# Patient Record
Sex: FEMALE | Race: WHITE | Employment: UNEMPLOYED | URBAN - NONMETROPOLITAN AREA
[De-identification: names, ages, dates, MRNs, and addresses within clinical notes are randomized per-mention and may not be internally consistent; named-entity substitution may affect disease eponyms.]

---

## 2023-07-18 ENCOUNTER — HOSPITAL ENCOUNTER (INPATIENT)
Facility: HOSPITAL | Age: 58
LOS: 17 days | Discharge: NURSING, REHAB OR LONG-TERM CARE | DRG: 751 | End: 2023-08-04
Attending: PSYCHIATRY & NEUROLOGY | Admitting: PSYCHIATRY & NEUROLOGY
Payer: MEDICAID

## 2023-07-18 PROBLEM — F32.9 MAJOR DEPRESSION, CHRONIC: Status: ACTIVE | Noted: 2023-07-18

## 2023-07-18 PROCEDURE — 1240000000 HC EMOTIONAL WELLNESS R&B

## 2023-07-18 PROCEDURE — 6370000000 HC RX 637 (ALT 250 FOR IP): Performed by: PSYCHIATRY & NEUROLOGY

## 2023-07-18 RX ORDER — DIPHENHYDRAMINE HYDROCHLORIDE 50 MG/ML
50 INJECTION INTRAMUSCULAR; INTRAVENOUS EVERY 4 HOURS PRN
Status: DISCONTINUED | OUTPATIENT
Start: 2023-07-18 | End: 2023-08-04 | Stop reason: HOSPADM

## 2023-07-18 RX ORDER — MAGNESIUM HYDROXIDE/ALUMINUM HYDROXICE/SIMETHICONE 120; 1200; 1200 MG/30ML; MG/30ML; MG/30ML
30 SUSPENSION ORAL EVERY 6 HOURS PRN
Status: DISCONTINUED | OUTPATIENT
Start: 2023-07-18 | End: 2023-08-04 | Stop reason: HOSPADM

## 2023-07-18 RX ORDER — ACETAMINOPHEN 325 MG/1
650 TABLET ORAL EVERY 4 HOURS PRN
Status: DISCONTINUED | OUTPATIENT
Start: 2023-07-18 | End: 2023-08-04 | Stop reason: HOSPADM

## 2023-07-18 RX ORDER — HALOPERIDOL 5 MG/ML
5 INJECTION INTRAMUSCULAR EVERY 4 HOURS PRN
Status: DISCONTINUED | OUTPATIENT
Start: 2023-07-18 | End: 2023-08-04 | Stop reason: HOSPADM

## 2023-07-18 RX ORDER — HALOPERIDOL 5 MG/1
5 TABLET ORAL EVERY 4 HOURS PRN
Status: DISCONTINUED | OUTPATIENT
Start: 2023-07-18 | End: 2023-08-04 | Stop reason: HOSPADM

## 2023-07-18 RX ORDER — POLYETHYLENE GLYCOL 3350 17 G/17G
17 POWDER, FOR SOLUTION ORAL DAILY PRN
Status: DISCONTINUED | OUTPATIENT
Start: 2023-07-18 | End: 2023-08-04 | Stop reason: HOSPADM

## 2023-07-18 RX ORDER — TRAZODONE HYDROCHLORIDE 50 MG/1
50 TABLET ORAL NIGHTLY PRN
Status: DISCONTINUED | OUTPATIENT
Start: 2023-07-18 | End: 2023-07-25

## 2023-07-18 RX ORDER — HYDROXYZINE 50 MG/1
50 TABLET, FILM COATED ORAL 3 TIMES DAILY PRN
Status: DISCONTINUED | OUTPATIENT
Start: 2023-07-18 | End: 2023-08-04 | Stop reason: HOSPADM

## 2023-07-18 RX ADMIN — TRAZODONE HYDROCHLORIDE 50 MG: 50 TABLET ORAL at 20:53

## 2023-07-18 RX ADMIN — HYDROXYZINE HYDROCHLORIDE 50 MG: 50 TABLET, FILM COATED ORAL at 22:38

## 2023-07-18 RX ADMIN — ACETAMINOPHEN 650 MG: 325 TABLET, FILM COATED ORAL at 20:02

## 2023-07-18 ASSESSMENT — PAIN SCALES - GENERAL
PAINLEVEL_OUTOF10: 10
PAINLEVEL_OUTOF10: 4

## 2023-07-18 ASSESSMENT — SLEEP AND FATIGUE QUESTIONNAIRES
AVERAGE NUMBER OF SLEEP HOURS: 6
DO YOU USE A SLEEP AID: NO
SLEEP PATTERN: DIFFICULTY FALLING ASLEEP;RESTLESSNESS
DO YOU HAVE DIFFICULTY SLEEPING: YES

## 2023-07-18 ASSESSMENT — PAIN DESCRIPTION - ORIENTATION: ORIENTATION: RIGHT

## 2023-07-18 ASSESSMENT — PAIN DESCRIPTION - LOCATION: LOCATION: SHOULDER

## 2023-07-18 ASSESSMENT — LIFESTYLE VARIABLES
HOW MANY STANDARD DRINKS CONTAINING ALCOHOL DO YOU HAVE ON A TYPICAL DAY: PATIENT DOES NOT DRINK
HOW OFTEN DO YOU HAVE A DRINK CONTAINING ALCOHOL: NEVER

## 2023-07-18 ASSESSMENT — PAIN DESCRIPTION - DESCRIPTORS: DESCRIPTORS: ACHING

## 2023-07-19 PROBLEM — F10.21 ALCOHOL USE DISORDER, MODERATE, IN EARLY REMISSION (HCC): Status: ACTIVE | Noted: 2023-07-19

## 2023-07-19 PROBLEM — F15.20 METHAMPHETAMINE USE DISORDER, SEVERE (HCC): Status: ACTIVE | Noted: 2023-07-19

## 2023-07-19 PROBLEM — F41.1 GAD (GENERALIZED ANXIETY DISORDER): Status: ACTIVE | Noted: 2023-07-19

## 2023-07-19 PROBLEM — F32.9 MAJOR DEPRESSION, CHRONIC: Status: RESOLVED | Noted: 2023-07-18 | Resolved: 2023-07-19

## 2023-07-19 PROBLEM — F33.2 MAJOR DEPRESSIVE DISORDER, RECURRENT EPISODE, SEVERE (HCC): Status: ACTIVE | Noted: 2023-07-19

## 2023-07-19 PROBLEM — F43.10 PTSD (POST-TRAUMATIC STRESS DISORDER): Status: ACTIVE | Noted: 2023-07-19

## 2023-07-19 PROBLEM — E44.0 MODERATE PROTEIN-ENERGY MALNUTRITION (HCC): Status: ACTIVE | Noted: 2023-07-19

## 2023-07-19 LAB
CHOLEST SERPL-MCNC: 173 MG/DL
EST. AVERAGE GLUCOSE BLD GHB EST-MCNC: 105 MG/DL
HBA1C MFR BLD: 5.3 % (ref 4–5.6)
HDLC SERPL-MCNC: 61 MG/DL
HDLC SERPL: 2.8 (ref 0–5)
LDLC SERPL CALC-MCNC: 94.2 MG/DL (ref 0–100)
LIPID PANEL: NORMAL
TRIGL SERPL-MCNC: 89 MG/DL
VLDLC SERPL CALC-MCNC: 17.8 MG/DL

## 2023-07-19 PROCEDURE — 6370000000 HC RX 637 (ALT 250 FOR IP): Performed by: PSYCHIATRY & NEUROLOGY

## 2023-07-19 PROCEDURE — 36415 COLL VENOUS BLD VENIPUNCTURE: CPT

## 2023-07-19 PROCEDURE — 80061 LIPID PANEL: CPT

## 2023-07-19 PROCEDURE — 1240000000 HC EMOTIONAL WELLNESS R&B

## 2023-07-19 PROCEDURE — 83036 HEMOGLOBIN GLYCOSYLATED A1C: CPT

## 2023-07-19 RX ORDER — FLUOXETINE HYDROCHLORIDE 20 MG/1
20 CAPSULE ORAL DAILY
Status: DISCONTINUED | OUTPATIENT
Start: 2023-07-19 | End: 2023-07-22

## 2023-07-19 RX ADMIN — HYDROXYZINE HYDROCHLORIDE 50 MG: 50 TABLET, FILM COATED ORAL at 19:50

## 2023-07-19 RX ADMIN — FLUOXETINE 20 MG: 20 CAPSULE ORAL at 10:20

## 2023-07-19 RX ADMIN — TRAZODONE HYDROCHLORIDE 50 MG: 50 TABLET ORAL at 21:57

## 2023-07-19 RX ADMIN — ACETAMINOPHEN 650 MG: 325 TABLET, FILM COATED ORAL at 10:20

## 2023-07-19 RX ADMIN — ACETAMINOPHEN 650 MG: 325 TABLET, FILM COATED ORAL at 21:57

## 2023-07-19 ASSESSMENT — PAIN DESCRIPTION - LOCATION
LOCATION: BACK

## 2023-07-19 ASSESSMENT — PAIN DESCRIPTION - DESCRIPTORS
DESCRIPTORS: ACHING

## 2023-07-19 ASSESSMENT — PAIN DESCRIPTION - ORIENTATION
ORIENTATION: LOWER

## 2023-07-19 ASSESSMENT — PAIN SCALES - GENERAL
PAINLEVEL_OUTOF10: 4
PAINLEVEL_OUTOF10: 4
PAINLEVEL_OUTOF10: 2

## 2023-07-19 ASSESSMENT — PAIN - FUNCTIONAL ASSESSMENT
PAIN_FUNCTIONAL_ASSESSMENT: ACTIVITIES ARE NOT PREVENTED
PAIN_FUNCTIONAL_ASSESSMENT: ACTIVITIES ARE NOT PREVENTED

## 2023-07-19 ASSESSMENT — PAIN DESCRIPTION - PAIN TYPE: TYPE: CHRONIC PAIN

## 2023-07-19 ASSESSMENT — PAIN DESCRIPTION - FREQUENCY: FREQUENCY: CONTINUOUS

## 2023-07-19 ASSESSMENT — PAIN DESCRIPTION - ONSET: ONSET: ON-GOING

## 2023-07-20 PROCEDURE — 6370000000 HC RX 637 (ALT 250 FOR IP): Performed by: PSYCHIATRY & NEUROLOGY

## 2023-07-20 PROCEDURE — 1240000000 HC EMOTIONAL WELLNESS R&B

## 2023-07-20 PROCEDURE — 6370000000 HC RX 637 (ALT 250 FOR IP): Performed by: INTERNAL MEDICINE

## 2023-07-20 RX ORDER — BUSPIRONE HYDROCHLORIDE 10 MG/1
10 TABLET ORAL 3 TIMES DAILY
Status: DISCONTINUED | OUTPATIENT
Start: 2023-07-20 | End: 2023-08-04 | Stop reason: HOSPADM

## 2023-07-20 RX ORDER — IBUPROFEN 600 MG/1
600 TABLET ORAL EVERY 8 HOURS PRN
Status: DISCONTINUED | OUTPATIENT
Start: 2023-07-20 | End: 2023-08-04 | Stop reason: HOSPADM

## 2023-07-20 RX ADMIN — HYDROXYZINE HYDROCHLORIDE 50 MG: 50 TABLET, FILM COATED ORAL at 08:57

## 2023-07-20 RX ADMIN — IBUPROFEN 600 MG: 600 TABLET ORAL at 14:13

## 2023-07-20 RX ADMIN — ACETAMINOPHEN 650 MG: 325 TABLET, FILM COATED ORAL at 12:31

## 2023-07-20 RX ADMIN — IBUPROFEN 600 MG: 600 TABLET ORAL at 22:32

## 2023-07-20 RX ADMIN — TRAZODONE HYDROCHLORIDE 50 MG: 50 TABLET ORAL at 22:32

## 2023-07-20 RX ADMIN — BUSPIRONE HYDROCHLORIDE 10 MG: 10 TABLET ORAL at 21:44

## 2023-07-20 RX ADMIN — FLUOXETINE 20 MG: 20 CAPSULE ORAL at 08:57

## 2023-07-20 RX ADMIN — BUSPIRONE HYDROCHLORIDE 10 MG: 10 TABLET ORAL at 14:13

## 2023-07-20 ASSESSMENT — PAIN DESCRIPTION - LOCATION
LOCATION: NECK;KNEE
LOCATION: NECK
LOCATION: NECK
LOCATION: KNEE;NECK

## 2023-07-20 ASSESSMENT — PAIN - FUNCTIONAL ASSESSMENT
PAIN_FUNCTIONAL_ASSESSMENT: ACTIVITIES ARE NOT PREVENTED

## 2023-07-20 ASSESSMENT — PAIN DESCRIPTION - ONSET: ONSET: ON-GOING

## 2023-07-20 ASSESSMENT — PAIN SCALES - GENERAL
PAINLEVEL_OUTOF10: 3
PAINLEVEL_OUTOF10: 6
PAINLEVEL_OUTOF10: 6
PAINLEVEL_OUTOF10: 1
PAINLEVEL_OUTOF10: 6

## 2023-07-20 ASSESSMENT — PAIN DESCRIPTION - PAIN TYPE
TYPE: CHRONIC PAIN
TYPE: CHRONIC PAIN

## 2023-07-20 ASSESSMENT — PAIN DESCRIPTION - DESCRIPTORS
DESCRIPTORS: ACHING
DESCRIPTORS: ACHING

## 2023-07-20 ASSESSMENT — PAIN DESCRIPTION - FREQUENCY: FREQUENCY: INTERMITTENT

## 2023-07-20 ASSESSMENT — PAIN DESCRIPTION - ORIENTATION
ORIENTATION: RIGHT
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
## 2023-07-21 PROCEDURE — 6370000000 HC RX 637 (ALT 250 FOR IP): Performed by: PSYCHIATRY & NEUROLOGY

## 2023-07-21 PROCEDURE — 1240000000 HC EMOTIONAL WELLNESS R&B

## 2023-07-21 PROCEDURE — 6370000000 HC RX 637 (ALT 250 FOR IP): Performed by: INTERNAL MEDICINE

## 2023-07-21 RX ADMIN — BUSPIRONE HYDROCHLORIDE 10 MG: 10 TABLET ORAL at 20:39

## 2023-07-21 RX ADMIN — ACETAMINOPHEN 650 MG: 325 TABLET, FILM COATED ORAL at 14:48

## 2023-07-21 RX ADMIN — FLUOXETINE 20 MG: 20 CAPSULE ORAL at 08:20

## 2023-07-21 RX ADMIN — IBUPROFEN 600 MG: 600 TABLET ORAL at 14:47

## 2023-07-21 RX ADMIN — BUSPIRONE HYDROCHLORIDE 10 MG: 10 TABLET ORAL at 14:44

## 2023-07-21 RX ADMIN — TRAZODONE HYDROCHLORIDE 50 MG: 50 TABLET ORAL at 20:39

## 2023-07-21 RX ADMIN — BUSPIRONE HYDROCHLORIDE 10 MG: 10 TABLET ORAL at 08:20

## 2023-07-21 ASSESSMENT — PAIN DESCRIPTION - FREQUENCY
FREQUENCY: INTERMITTENT
FREQUENCY: INTERMITTENT

## 2023-07-21 ASSESSMENT — PAIN - FUNCTIONAL ASSESSMENT
PAIN_FUNCTIONAL_ASSESSMENT: ACTIVITIES ARE NOT PREVENTED

## 2023-07-21 ASSESSMENT — PAIN DESCRIPTION - LOCATION
LOCATION: NECK
LOCATION: BACK
LOCATION: NECK

## 2023-07-21 ASSESSMENT — PAIN SCALES - GENERAL
PAINLEVEL_OUTOF10: 3
PAINLEVEL_OUTOF10: 3
PAINLEVEL_OUTOF10: 5
PAINLEVEL_OUTOF10: 6

## 2023-07-21 ASSESSMENT — PAIN DESCRIPTION - DESCRIPTORS
DESCRIPTORS: ACHING

## 2023-07-21 ASSESSMENT — PAIN DESCRIPTION - PAIN TYPE
TYPE: CHRONIC PAIN
TYPE: CHRONIC PAIN

## 2023-07-21 ASSESSMENT — PAIN DESCRIPTION - ONSET
ONSET: ON-GOING
ONSET: ON-GOING

## 2023-07-21 ASSESSMENT — PAIN DESCRIPTION - ORIENTATION
ORIENTATION: LOWER
ORIENTATION: LOWER

## 2023-07-22 PROCEDURE — 6370000000 HC RX 637 (ALT 250 FOR IP): Performed by: INTERNAL MEDICINE

## 2023-07-22 PROCEDURE — 6370000000 HC RX 637 (ALT 250 FOR IP): Performed by: PSYCHIATRY & NEUROLOGY

## 2023-07-22 PROCEDURE — 1240000000 HC EMOTIONAL WELLNESS R&B

## 2023-07-22 RX ORDER — FLUOXETINE HYDROCHLORIDE 20 MG/1
40 CAPSULE ORAL DAILY
Status: DISCONTINUED | OUTPATIENT
Start: 2023-07-23 | End: 2023-08-04 | Stop reason: HOSPADM

## 2023-07-22 RX ADMIN — HYDROXYZINE HYDROCHLORIDE 50 MG: 50 TABLET, FILM COATED ORAL at 20:49

## 2023-07-22 RX ADMIN — BUSPIRONE HYDROCHLORIDE 10 MG: 10 TABLET ORAL at 20:49

## 2023-07-22 RX ADMIN — BUSPIRONE HYDROCHLORIDE 10 MG: 10 TABLET ORAL at 08:20

## 2023-07-22 RX ADMIN — IBUPROFEN 600 MG: 600 TABLET ORAL at 08:20

## 2023-07-22 RX ADMIN — ACETAMINOPHEN 650 MG: 325 TABLET, FILM COATED ORAL at 20:49

## 2023-07-22 RX ADMIN — TRAZODONE HYDROCHLORIDE 50 MG: 50 TABLET ORAL at 20:49

## 2023-07-22 RX ADMIN — FLUOXETINE 20 MG: 20 CAPSULE ORAL at 08:20

## 2023-07-22 RX ADMIN — BUSPIRONE HYDROCHLORIDE 10 MG: 10 TABLET ORAL at 14:10

## 2023-07-22 ASSESSMENT — PAIN - FUNCTIONAL ASSESSMENT: PAIN_FUNCTIONAL_ASSESSMENT: ACTIVITIES ARE NOT PREVENTED

## 2023-07-22 ASSESSMENT — PAIN SCALES - GENERAL
PAINLEVEL_OUTOF10: 4
PAINLEVEL_OUTOF10: 2
PAINLEVEL_OUTOF10: 8

## 2023-07-22 ASSESSMENT — PAIN DESCRIPTION - DESCRIPTORS
DESCRIPTORS: ACHING
DESCRIPTORS: ACHING

## 2023-07-22 ASSESSMENT — PAIN DESCRIPTION - ONSET: ONSET: ON-GOING

## 2023-07-22 ASSESSMENT — PAIN DESCRIPTION - PAIN TYPE: TYPE: CHRONIC PAIN

## 2023-07-22 ASSESSMENT — PAIN DESCRIPTION - FREQUENCY: FREQUENCY: INTERMITTENT

## 2023-07-22 ASSESSMENT — PAIN DESCRIPTION - ORIENTATION
ORIENTATION: LEFT
ORIENTATION: RIGHT

## 2023-07-22 ASSESSMENT — PAIN DESCRIPTION - LOCATION
LOCATION: SHOULDER
LOCATION: SHOULDER

## 2023-07-23 PROCEDURE — 1240000000 HC EMOTIONAL WELLNESS R&B

## 2023-07-23 PROCEDURE — 6370000000 HC RX 637 (ALT 250 FOR IP): Performed by: PSYCHIATRY & NEUROLOGY

## 2023-07-23 RX ADMIN — FLUOXETINE 40 MG: 20 CAPSULE ORAL at 08:04

## 2023-07-23 RX ADMIN — TRAZODONE HYDROCHLORIDE 50 MG: 50 TABLET ORAL at 21:03

## 2023-07-23 RX ADMIN — BUSPIRONE HYDROCHLORIDE 10 MG: 10 TABLET ORAL at 21:03

## 2023-07-23 RX ADMIN — BUSPIRONE HYDROCHLORIDE 10 MG: 10 TABLET ORAL at 16:51

## 2023-07-23 RX ADMIN — BUSPIRONE HYDROCHLORIDE 10 MG: 10 TABLET ORAL at 08:04

## 2023-07-23 ASSESSMENT — PAIN SCALES - GENERAL
PAINLEVEL_OUTOF10: 2
PAINLEVEL_OUTOF10: 0

## 2023-07-23 ASSESSMENT — PAIN DESCRIPTION - DESCRIPTORS: DESCRIPTORS: ACHING

## 2023-07-23 ASSESSMENT — PAIN DESCRIPTION - ORIENTATION: ORIENTATION: LEFT

## 2023-07-23 ASSESSMENT — PAIN DESCRIPTION - LOCATION: LOCATION: SHOULDER

## 2023-07-24 PROCEDURE — 6370000000 HC RX 637 (ALT 250 FOR IP): Performed by: PSYCHIATRY & NEUROLOGY

## 2023-07-24 PROCEDURE — 1240000000 HC EMOTIONAL WELLNESS R&B

## 2023-07-24 PROCEDURE — 6370000000 HC RX 637 (ALT 250 FOR IP): Performed by: INTERNAL MEDICINE

## 2023-07-24 RX ORDER — CYCLOBENZAPRINE HCL 5 MG
5 TABLET ORAL 2 TIMES DAILY PRN
Status: DISCONTINUED | OUTPATIENT
Start: 2023-07-24 | End: 2023-08-04 | Stop reason: HOSPADM

## 2023-07-24 RX ORDER — CYCLOBENZAPRINE HCL 5 MG
5 TABLET ORAL 3 TIMES DAILY PRN
Status: DISCONTINUED | OUTPATIENT
Start: 2023-07-24 | End: 2023-07-24

## 2023-07-24 RX ADMIN — IBUPROFEN 600 MG: 600 TABLET ORAL at 00:52

## 2023-07-24 RX ADMIN — ACETAMINOPHEN 650 MG: 325 TABLET, FILM COATED ORAL at 21:02

## 2023-07-24 RX ADMIN — CYCLOBENZAPRINE HYDROCHLORIDE 5 MG: 5 TABLET, FILM COATED ORAL at 11:20

## 2023-07-24 RX ADMIN — FLUOXETINE 40 MG: 20 CAPSULE ORAL at 08:07

## 2023-07-24 RX ADMIN — BUSPIRONE HYDROCHLORIDE 10 MG: 10 TABLET ORAL at 20:55

## 2023-07-24 RX ADMIN — BUSPIRONE HYDROCHLORIDE 10 MG: 10 TABLET ORAL at 08:07

## 2023-07-24 RX ADMIN — CYCLOBENZAPRINE HYDROCHLORIDE 5 MG: 5 TABLET, FILM COATED ORAL at 21:03

## 2023-07-24 RX ADMIN — BUSPIRONE HYDROCHLORIDE 10 MG: 10 TABLET ORAL at 13:20

## 2023-07-24 RX ADMIN — TRAZODONE HYDROCHLORIDE 50 MG: 50 TABLET ORAL at 20:55

## 2023-07-24 ASSESSMENT — PAIN DESCRIPTION - LOCATION
LOCATION: SHOULDER
LOCATION: BACK;NECK
LOCATION: SHOULDER

## 2023-07-24 ASSESSMENT — PAIN SCALES - GENERAL
PAINLEVEL_OUTOF10: 7
PAINLEVEL_OUTOF10: 7
PAINLEVEL_OUTOF10: 4
PAINLEVEL_OUTOF10: 0
PAINLEVEL_OUTOF10: 0
PAINLEVEL_OUTOF10: 4

## 2023-07-24 ASSESSMENT — PAIN DESCRIPTION - ORIENTATION
ORIENTATION: MID
ORIENTATION: LEFT
ORIENTATION: RIGHT

## 2023-07-24 ASSESSMENT — PAIN DESCRIPTION - DESCRIPTORS
DESCRIPTORS: ACHING

## 2023-07-24 ASSESSMENT — PAIN - FUNCTIONAL ASSESSMENT: PAIN_FUNCTIONAL_ASSESSMENT: ACTIVITIES ARE NOT PREVENTED

## 2023-07-25 PROCEDURE — 6370000000 HC RX 637 (ALT 250 FOR IP): Performed by: INTERNAL MEDICINE

## 2023-07-25 PROCEDURE — 6370000000 HC RX 637 (ALT 250 FOR IP): Performed by: PSYCHIATRY & NEUROLOGY

## 2023-07-25 PROCEDURE — 1240000000 HC EMOTIONAL WELLNESS R&B

## 2023-07-25 RX ORDER — TRAZODONE HYDROCHLORIDE 100 MG/1
100 TABLET ORAL NIGHTLY PRN
Status: DISCONTINUED | OUTPATIENT
Start: 2023-07-25 | End: 2023-08-04 | Stop reason: HOSPADM

## 2023-07-25 RX ADMIN — BUSPIRONE HYDROCHLORIDE 10 MG: 10 TABLET ORAL at 08:27

## 2023-07-25 RX ADMIN — IBUPROFEN 600 MG: 600 TABLET ORAL at 15:52

## 2023-07-25 RX ADMIN — FLUOXETINE 40 MG: 20 CAPSULE ORAL at 08:27

## 2023-07-25 RX ADMIN — BUSPIRONE HYDROCHLORIDE 10 MG: 10 TABLET ORAL at 20:54

## 2023-07-25 RX ADMIN — ACETAMINOPHEN 650 MG: 325 TABLET, FILM COATED ORAL at 20:54

## 2023-07-25 RX ADMIN — BUSPIRONE HYDROCHLORIDE 10 MG: 10 TABLET ORAL at 15:52

## 2023-07-25 RX ADMIN — CYCLOBENZAPRINE HYDROCHLORIDE 5 MG: 5 TABLET, FILM COATED ORAL at 10:27

## 2023-07-25 RX ADMIN — CYCLOBENZAPRINE HYDROCHLORIDE 5 MG: 5 TABLET, FILM COATED ORAL at 20:53

## 2023-07-25 RX ADMIN — TRAZODONE HYDROCHLORIDE 100 MG: 100 TABLET ORAL at 20:54

## 2023-07-25 ASSESSMENT — PAIN DESCRIPTION - DESCRIPTORS
DESCRIPTORS: ACHING

## 2023-07-25 ASSESSMENT — PAIN SCALES - GENERAL
PAINLEVEL_OUTOF10: 7
PAINLEVEL_OUTOF10: 0
PAINLEVEL_OUTOF10: 3
PAINLEVEL_OUTOF10: 0
PAINLEVEL_OUTOF10: 4
PAINLEVEL_OUTOF10: 0
PAINLEVEL_OUTOF10: 7
PAINLEVEL_OUTOF10: 3

## 2023-07-25 ASSESSMENT — PAIN DESCRIPTION - ORIENTATION: ORIENTATION: LEFT

## 2023-07-25 ASSESSMENT — PAIN DESCRIPTION - LOCATION
LOCATION: NECK

## 2023-07-25 ASSESSMENT — PAIN - FUNCTIONAL ASSESSMENT
PAIN_FUNCTIONAL_ASSESSMENT: ACTIVITIES ARE NOT PREVENTED

## 2023-07-25 ASSESSMENT — PAIN DESCRIPTION - ONSET: ONSET: ON-GOING

## 2023-07-25 ASSESSMENT — PAIN DESCRIPTION - PAIN TYPE: TYPE: CHRONIC PAIN

## 2023-07-25 ASSESSMENT — PAIN DESCRIPTION - FREQUENCY: FREQUENCY: CONTINUOUS

## 2023-07-26 PROCEDURE — 1240000000 HC EMOTIONAL WELLNESS R&B

## 2023-07-26 PROCEDURE — 6370000000 HC RX 637 (ALT 250 FOR IP): Performed by: INTERNAL MEDICINE

## 2023-07-26 PROCEDURE — 6370000000 HC RX 637 (ALT 250 FOR IP): Performed by: PSYCHIATRY & NEUROLOGY

## 2023-07-26 PROCEDURE — 6370000000 HC RX 637 (ALT 250 FOR IP): Performed by: HOSPITALIST

## 2023-07-26 RX ORDER — FLUTICASONE PROPIONATE 50 MCG
2 SPRAY, SUSPENSION (ML) NASAL DAILY
Status: DISCONTINUED | OUTPATIENT
Start: 2023-07-26 | End: 2023-07-28

## 2023-07-26 RX ORDER — ONDANSETRON 4 MG/1
4 TABLET, ORALLY DISINTEGRATING ORAL EVERY 6 HOURS PRN
Status: DISCONTINUED | OUTPATIENT
Start: 2023-07-26 | End: 2023-08-04 | Stop reason: HOSPADM

## 2023-07-26 RX ORDER — CETIRIZINE HYDROCHLORIDE 10 MG/1
10 TABLET ORAL DAILY
Status: DISCONTINUED | OUTPATIENT
Start: 2023-07-26 | End: 2023-08-04 | Stop reason: HOSPADM

## 2023-07-26 RX ADMIN — BUSPIRONE HYDROCHLORIDE 10 MG: 10 TABLET ORAL at 21:00

## 2023-07-26 RX ADMIN — CYCLOBENZAPRINE HYDROCHLORIDE 5 MG: 5 TABLET, FILM COATED ORAL at 13:07

## 2023-07-26 RX ADMIN — IBUPROFEN 600 MG: 600 TABLET ORAL at 08:10

## 2023-07-26 RX ADMIN — FLUTICASONE PROPIONATE 2 SPRAY: 50 SPRAY, METERED NASAL at 16:13

## 2023-07-26 RX ADMIN — BUSPIRONE HYDROCHLORIDE 10 MG: 10 TABLET ORAL at 08:10

## 2023-07-26 RX ADMIN — FLUOXETINE 40 MG: 20 CAPSULE ORAL at 08:10

## 2023-07-26 RX ADMIN — CETIRIZINE HYDROCHLORIDE 10 MG: 10 TABLET, FILM COATED ORAL at 14:51

## 2023-07-26 RX ADMIN — TRAZODONE HYDROCHLORIDE 100 MG: 100 TABLET ORAL at 21:00

## 2023-07-26 RX ADMIN — BUSPIRONE HYDROCHLORIDE 10 MG: 10 TABLET ORAL at 13:07

## 2023-07-26 RX ADMIN — IBUPROFEN 600 MG: 600 TABLET ORAL at 21:41

## 2023-07-26 RX ADMIN — ONDANSETRON 4 MG: 4 TABLET, ORALLY DISINTEGRATING ORAL at 21:49

## 2023-07-26 ASSESSMENT — PAIN DESCRIPTION - ORIENTATION
ORIENTATION: RIGHT
ORIENTATION: LEFT
ORIENTATION: LEFT

## 2023-07-26 ASSESSMENT — PAIN DESCRIPTION - DESCRIPTORS
DESCRIPTORS: ACHING;SHOOTING
DESCRIPTORS: ACHING;SPASM
DESCRIPTORS: ACHING
DESCRIPTORS: ACHING

## 2023-07-26 ASSESSMENT — PAIN SCALES - GENERAL
PAINLEVEL_OUTOF10: 6
PAINLEVEL_OUTOF10: 4
PAINLEVEL_OUTOF10: 6
PAINLEVEL_OUTOF10: 10

## 2023-07-26 ASSESSMENT — PAIN DESCRIPTION - LOCATION
LOCATION: NECK
LOCATION: OTHER (COMMENT)

## 2023-07-27 PROCEDURE — 6370000000 HC RX 637 (ALT 250 FOR IP): Performed by: PSYCHIATRY & NEUROLOGY

## 2023-07-27 PROCEDURE — 6370000000 HC RX 637 (ALT 250 FOR IP): Performed by: INTERNAL MEDICINE

## 2023-07-27 PROCEDURE — 6370000000 HC RX 637 (ALT 250 FOR IP): Performed by: HOSPITALIST

## 2023-07-27 PROCEDURE — 1240000000 HC EMOTIONAL WELLNESS R&B

## 2023-07-27 RX ADMIN — BUSPIRONE HYDROCHLORIDE 10 MG: 10 TABLET ORAL at 21:43

## 2023-07-27 RX ADMIN — CYCLOBENZAPRINE HYDROCHLORIDE 5 MG: 5 TABLET, FILM COATED ORAL at 13:41

## 2023-07-27 RX ADMIN — BUSPIRONE HYDROCHLORIDE 10 MG: 10 TABLET ORAL at 08:19

## 2023-07-27 RX ADMIN — IBUPROFEN 600 MG: 600 TABLET ORAL at 13:39

## 2023-07-27 RX ADMIN — CETIRIZINE HYDROCHLORIDE 10 MG: 10 TABLET, FILM COATED ORAL at 08:19

## 2023-07-27 RX ADMIN — FLUTICASONE PROPIONATE 2 SPRAY: 50 SPRAY, METERED NASAL at 08:21

## 2023-07-27 RX ADMIN — Medication 1 LOZENGE: at 08:20

## 2023-07-27 RX ADMIN — BUSPIRONE HYDROCHLORIDE 10 MG: 10 TABLET ORAL at 13:38

## 2023-07-27 RX ADMIN — FLUOXETINE 40 MG: 20 CAPSULE ORAL at 08:19

## 2023-07-27 RX ADMIN — TRAZODONE HYDROCHLORIDE 100 MG: 100 TABLET ORAL at 21:43

## 2023-07-27 ASSESSMENT — PAIN SCALES - GENERAL
PAINLEVEL_OUTOF10: 2
PAINLEVEL_OUTOF10: 2
PAINLEVEL_OUTOF10: 7
PAINLEVEL_OUTOF10: 4
PAINLEVEL_OUTOF10: 9
PAINLEVEL_OUTOF10: 9
PAINLEVEL_OUTOF10: 4
PAINLEVEL_OUTOF10: 9
PAINLEVEL_OUTOF10: 7

## 2023-07-27 ASSESSMENT — PAIN DESCRIPTION - LOCATION
LOCATION: GENERALIZED
LOCATION: THROAT
LOCATION: HEAD;NECK
LOCATION: HEAD;NECK
LOCATION: HEAD

## 2023-07-27 ASSESSMENT — PAIN - FUNCTIONAL ASSESSMENT
PAIN_FUNCTIONAL_ASSESSMENT: ACTIVITIES ARE NOT PREVENTED

## 2023-07-27 ASSESSMENT — PAIN DESCRIPTION - DESCRIPTORS
DESCRIPTORS: SORE
DESCRIPTORS: ACHING

## 2023-07-27 ASSESSMENT — PAIN DESCRIPTION - FREQUENCY: FREQUENCY: CONTINUOUS

## 2023-07-27 ASSESSMENT — PAIN DESCRIPTION - PAIN TYPE: TYPE: CHRONIC PAIN

## 2023-07-27 ASSESSMENT — PAIN DESCRIPTION - ONSET: ONSET: ON-GOING

## 2023-07-28 PROCEDURE — 6370000000 HC RX 637 (ALT 250 FOR IP): Performed by: PSYCHIATRY & NEUROLOGY

## 2023-07-28 PROCEDURE — 1240000000 HC EMOTIONAL WELLNESS R&B

## 2023-07-28 PROCEDURE — 6370000000 HC RX 637 (ALT 250 FOR IP): Performed by: HOSPITALIST

## 2023-07-28 RX ORDER — FLUTICASONE PROPIONATE 50 MCG
2 SPRAY, SUSPENSION (ML) NASAL 2 TIMES DAILY
Status: DISCONTINUED | OUTPATIENT
Start: 2023-07-29 | End: 2023-08-04 | Stop reason: HOSPADM

## 2023-07-28 RX ADMIN — Medication 1 LOZENGE: at 21:04

## 2023-07-28 RX ADMIN — BUSPIRONE HYDROCHLORIDE 10 MG: 10 TABLET ORAL at 21:02

## 2023-07-28 RX ADMIN — FLUTICASONE PROPIONATE 2 SPRAY: 50 SPRAY, METERED NASAL at 08:55

## 2023-07-28 RX ADMIN — BUSPIRONE HYDROCHLORIDE 10 MG: 10 TABLET ORAL at 08:35

## 2023-07-28 RX ADMIN — CETIRIZINE HYDROCHLORIDE 10 MG: 10 TABLET, FILM COATED ORAL at 08:35

## 2023-07-28 RX ADMIN — FLUOXETINE 40 MG: 20 CAPSULE ORAL at 08:35

## 2023-07-28 RX ADMIN — CYCLOBENZAPRINE HYDROCHLORIDE 5 MG: 5 TABLET, FILM COATED ORAL at 17:28

## 2023-07-28 RX ADMIN — TRAZODONE HYDROCHLORIDE 100 MG: 100 TABLET ORAL at 21:03

## 2023-07-28 RX ADMIN — CYCLOBENZAPRINE HYDROCHLORIDE 5 MG: 5 TABLET, FILM COATED ORAL at 21:02

## 2023-07-28 RX ADMIN — ACETAMINOPHEN 650 MG: 325 TABLET, FILM COATED ORAL at 21:03

## 2023-07-28 RX ADMIN — BUSPIRONE HYDROCHLORIDE 10 MG: 10 TABLET ORAL at 14:15

## 2023-07-28 ASSESSMENT — PAIN DESCRIPTION - ONSET: ONSET: ON-GOING

## 2023-07-28 ASSESSMENT — PAIN DESCRIPTION - ORIENTATION
ORIENTATION: MID
ORIENTATION: MID

## 2023-07-28 ASSESSMENT — PAIN SCALES - GENERAL
PAINLEVEL_OUTOF10: 4
PAINLEVEL_OUTOF10: 4
PAINLEVEL_OUTOF10: 0

## 2023-07-28 ASSESSMENT — PAIN DESCRIPTION - DESCRIPTORS
DESCRIPTORS: ACHING
DESCRIPTORS: ACHING

## 2023-07-28 ASSESSMENT — PAIN DESCRIPTION - LOCATION
LOCATION: NECK
LOCATION: NECK;LEG

## 2023-07-28 ASSESSMENT — PAIN DESCRIPTION - FREQUENCY: FREQUENCY: CONTINUOUS

## 2023-07-28 ASSESSMENT — PAIN DESCRIPTION - PAIN TYPE: TYPE: CHRONIC PAIN

## 2023-07-29 PROCEDURE — 1240000000 HC EMOTIONAL WELLNESS R&B

## 2023-07-29 PROCEDURE — 6370000000 HC RX 637 (ALT 250 FOR IP): Performed by: HOSPITALIST

## 2023-07-29 PROCEDURE — 6370000000 HC RX 637 (ALT 250 FOR IP): Performed by: PSYCHIATRY & NEUROLOGY

## 2023-07-29 RX ORDER — DIPHENHYDRAMINE HYDROCHLORIDE 50 MG/ML
25 INJECTION INTRAMUSCULAR; INTRAVENOUS EVERY 12 HOURS PRN
Status: DISCONTINUED | OUTPATIENT
Start: 2023-07-29 | End: 2023-07-29

## 2023-07-29 RX ADMIN — FLUOXETINE 40 MG: 20 CAPSULE ORAL at 08:20

## 2023-07-29 RX ADMIN — CETIRIZINE HYDROCHLORIDE 10 MG: 10 TABLET, FILM COATED ORAL at 08:20

## 2023-07-29 RX ADMIN — ACETAMINOPHEN 650 MG: 325 TABLET, FILM COATED ORAL at 10:54

## 2023-07-29 RX ADMIN — FLUTICASONE PROPIONATE 2 SPRAY: 50 SPRAY, METERED NASAL at 20:47

## 2023-07-29 RX ADMIN — BUSPIRONE HYDROCHLORIDE 10 MG: 10 TABLET ORAL at 14:46

## 2023-07-29 RX ADMIN — Medication 1 LOZENGE: at 20:45

## 2023-07-29 RX ADMIN — TRAZODONE HYDROCHLORIDE 100 MG: 100 TABLET ORAL at 20:46

## 2023-07-29 RX ADMIN — CYCLOBENZAPRINE HYDROCHLORIDE 5 MG: 5 TABLET, FILM COATED ORAL at 10:55

## 2023-07-29 RX ADMIN — BUSPIRONE HYDROCHLORIDE 10 MG: 10 TABLET ORAL at 08:20

## 2023-07-29 RX ADMIN — BUSPIRONE HYDROCHLORIDE 10 MG: 10 TABLET ORAL at 20:45

## 2023-07-29 RX ADMIN — FLUTICASONE PROPIONATE 2 SPRAY: 50 SPRAY, METERED NASAL at 08:19

## 2023-07-29 RX ADMIN — CYCLOBENZAPRINE HYDROCHLORIDE 5 MG: 5 TABLET, FILM COATED ORAL at 20:47

## 2023-07-29 RX ADMIN — ACETAMINOPHEN 650 MG: 325 TABLET, FILM COATED ORAL at 20:44

## 2023-07-29 RX ADMIN — Medication 1 LOZENGE: at 10:55

## 2023-07-29 ASSESSMENT — PAIN SCALES - GENERAL
PAINLEVEL_OUTOF10: 3
PAINLEVEL_OUTOF10: 4
PAINLEVEL_OUTOF10: 0
PAINLEVEL_OUTOF10: 4
PAINLEVEL_OUTOF10: 2

## 2023-07-29 ASSESSMENT — PAIN - FUNCTIONAL ASSESSMENT
PAIN_FUNCTIONAL_ASSESSMENT: ACTIVITIES ARE NOT PREVENTED

## 2023-07-29 ASSESSMENT — PAIN DESCRIPTION - LOCATION
LOCATION: HEAD
LOCATION: NECK
LOCATION: SHOULDER

## 2023-07-29 ASSESSMENT — PAIN DESCRIPTION - DESCRIPTORS
DESCRIPTORS: ACHING;THROBBING
DESCRIPTORS: ACHING
DESCRIPTORS: ACHING

## 2023-07-29 ASSESSMENT — PAIN DESCRIPTION - ORIENTATION
ORIENTATION: MID
ORIENTATION: UPPER
ORIENTATION: MID

## 2023-07-29 ASSESSMENT — PAIN DESCRIPTION - PAIN TYPE: TYPE: CHRONIC PAIN

## 2023-07-30 PROCEDURE — 6370000000 HC RX 637 (ALT 250 FOR IP): Performed by: HOSPITALIST

## 2023-07-30 PROCEDURE — 6370000000 HC RX 637 (ALT 250 FOR IP): Performed by: PSYCHIATRY & NEUROLOGY

## 2023-07-30 PROCEDURE — 1240000000 HC EMOTIONAL WELLNESS R&B

## 2023-07-30 RX ADMIN — Medication 1 LOZENGE: at 20:57

## 2023-07-30 RX ADMIN — CYCLOBENZAPRINE HYDROCHLORIDE 5 MG: 5 TABLET, FILM COATED ORAL at 14:12

## 2023-07-30 RX ADMIN — FLUOXETINE 40 MG: 20 CAPSULE ORAL at 08:13

## 2023-07-30 RX ADMIN — FLUTICASONE PROPIONATE 2 SPRAY: 50 SPRAY, METERED NASAL at 08:13

## 2023-07-30 RX ADMIN — CETIRIZINE HYDROCHLORIDE 10 MG: 10 TABLET, FILM COATED ORAL at 08:13

## 2023-07-30 RX ADMIN — BUSPIRONE HYDROCHLORIDE 10 MG: 10 TABLET ORAL at 20:57

## 2023-07-30 RX ADMIN — FLUTICASONE PROPIONATE 2 SPRAY: 50 SPRAY, METERED NASAL at 21:00

## 2023-07-30 RX ADMIN — CYCLOBENZAPRINE HYDROCHLORIDE 5 MG: 5 TABLET, FILM COATED ORAL at 20:57

## 2023-07-30 RX ADMIN — ACETAMINOPHEN 650 MG: 325 TABLET, FILM COATED ORAL at 20:56

## 2023-07-30 RX ADMIN — BUSPIRONE HYDROCHLORIDE 10 MG: 10 TABLET ORAL at 14:12

## 2023-07-30 RX ADMIN — ALUMINUM HYDROXIDE, MAGNESIUM HYDROXIDE, AND SIMETHICONE 30 ML: 200; 200; 20 SUSPENSION ORAL at 16:13

## 2023-07-30 RX ADMIN — BUSPIRONE HYDROCHLORIDE 10 MG: 10 TABLET ORAL at 08:14

## 2023-07-30 RX ADMIN — TRAZODONE HYDROCHLORIDE 100 MG: 100 TABLET ORAL at 20:57

## 2023-07-30 ASSESSMENT — PAIN SCALES - GENERAL
PAINLEVEL_OUTOF10: 1
PAINLEVEL_OUTOF10: 5
PAINLEVEL_OUTOF10: 3
PAINLEVEL_OUTOF10: 0
PAINLEVEL_OUTOF10: 3
PAINLEVEL_OUTOF10: 4
PAINLEVEL_OUTOF10: 3

## 2023-07-30 ASSESSMENT — PAIN DESCRIPTION - DESCRIPTORS
DESCRIPTORS: ACHING
DESCRIPTORS: SORE
DESCRIPTORS: ACHING
DESCRIPTORS: BURNING
DESCRIPTORS: ACHING

## 2023-07-30 ASSESSMENT — PAIN - FUNCTIONAL ASSESSMENT
PAIN_FUNCTIONAL_ASSESSMENT: ACTIVITIES ARE NOT PREVENTED

## 2023-07-30 ASSESSMENT — PAIN DESCRIPTION - LOCATION
LOCATION: OTHER (COMMENT)
LOCATION: THROAT
LOCATION: BACK
LOCATION: ABDOMEN
LOCATION: OTHER (COMMENT)

## 2023-07-30 ASSESSMENT — PAIN DESCRIPTION - ORIENTATION
ORIENTATION: MID;UPPER
ORIENTATION: MID
ORIENTATION: MID

## 2023-07-30 ASSESSMENT — PAIN DESCRIPTION - PAIN TYPE: TYPE: CHRONIC PAIN

## 2023-07-31 PROCEDURE — 6370000000 HC RX 637 (ALT 250 FOR IP): Performed by: HOSPITALIST

## 2023-07-31 PROCEDURE — 6370000000 HC RX 637 (ALT 250 FOR IP): Performed by: PSYCHIATRY & NEUROLOGY

## 2023-07-31 PROCEDURE — 1240000000 HC EMOTIONAL WELLNESS R&B

## 2023-07-31 RX ADMIN — Medication 1 LOZENGE: at 15:48

## 2023-07-31 RX ADMIN — FLUTICASONE PROPIONATE 2 SPRAY: 50 SPRAY, METERED NASAL at 09:29

## 2023-07-31 RX ADMIN — FLUTICASONE PROPIONATE 2 SPRAY: 50 SPRAY, METERED NASAL at 21:03

## 2023-07-31 RX ADMIN — CETIRIZINE HYDROCHLORIDE 10 MG: 10 TABLET, FILM COATED ORAL at 08:47

## 2023-07-31 RX ADMIN — BUSPIRONE HYDROCHLORIDE 10 MG: 10 TABLET ORAL at 08:47

## 2023-07-31 RX ADMIN — FLUOXETINE 40 MG: 20 CAPSULE ORAL at 08:47

## 2023-07-31 RX ADMIN — BUSPIRONE HYDROCHLORIDE 10 MG: 10 TABLET ORAL at 21:03

## 2023-07-31 RX ADMIN — BUSPIRONE HYDROCHLORIDE 10 MG: 10 TABLET ORAL at 15:26

## 2023-07-31 RX ADMIN — TRAZODONE HYDROCHLORIDE 100 MG: 100 TABLET ORAL at 21:03

## 2023-07-31 RX ADMIN — CYCLOBENZAPRINE HYDROCHLORIDE 5 MG: 5 TABLET, FILM COATED ORAL at 21:06

## 2023-08-01 PROCEDURE — 6370000000 HC RX 637 (ALT 250 FOR IP): Performed by: PSYCHIATRY & NEUROLOGY

## 2023-08-01 PROCEDURE — 1240000000 HC EMOTIONAL WELLNESS R&B

## 2023-08-01 PROCEDURE — 6370000000 HC RX 637 (ALT 250 FOR IP): Performed by: HOSPITALIST

## 2023-08-01 RX ADMIN — BUSPIRONE HYDROCHLORIDE 10 MG: 10 TABLET ORAL at 20:38

## 2023-08-01 RX ADMIN — TRAZODONE HYDROCHLORIDE 100 MG: 100 TABLET ORAL at 20:38

## 2023-08-01 RX ADMIN — FLUTICASONE PROPIONATE 2 SPRAY: 50 SPRAY, METERED NASAL at 08:33

## 2023-08-01 RX ADMIN — BUSPIRONE HYDROCHLORIDE 10 MG: 10 TABLET ORAL at 15:13

## 2023-08-01 RX ADMIN — BUSPIRONE HYDROCHLORIDE 10 MG: 10 TABLET ORAL at 08:32

## 2023-08-01 RX ADMIN — FLUTICASONE PROPIONATE 2 SPRAY: 50 SPRAY, METERED NASAL at 20:39

## 2023-08-01 RX ADMIN — CETIRIZINE HYDROCHLORIDE 10 MG: 10 TABLET, FILM COATED ORAL at 08:33

## 2023-08-01 RX ADMIN — FLUOXETINE 40 MG: 20 CAPSULE ORAL at 08:32

## 2023-08-01 RX ADMIN — CYCLOBENZAPRINE HYDROCHLORIDE 5 MG: 5 TABLET, FILM COATED ORAL at 21:25

## 2023-08-01 ASSESSMENT — PAIN DESCRIPTION - LOCATION: LOCATION: NECK

## 2023-08-01 ASSESSMENT — PAIN DESCRIPTION - ORIENTATION: ORIENTATION: RIGHT

## 2023-08-02 PROCEDURE — 6370000000 HC RX 637 (ALT 250 FOR IP): Performed by: PSYCHIATRY & NEUROLOGY

## 2023-08-02 PROCEDURE — 1240000000 HC EMOTIONAL WELLNESS R&B

## 2023-08-02 PROCEDURE — 6370000000 HC RX 637 (ALT 250 FOR IP): Performed by: HOSPITALIST

## 2023-08-02 RX ADMIN — Medication 1 LOZENGE: at 21:17

## 2023-08-02 RX ADMIN — FLUTICASONE PROPIONATE 2 SPRAY: 50 SPRAY, METERED NASAL at 08:52

## 2023-08-02 RX ADMIN — BUSPIRONE HYDROCHLORIDE 10 MG: 10 TABLET ORAL at 08:52

## 2023-08-02 RX ADMIN — CETIRIZINE HYDROCHLORIDE 10 MG: 10 TABLET, FILM COATED ORAL at 08:52

## 2023-08-02 RX ADMIN — ACETAMINOPHEN 650 MG: 325 TABLET, FILM COATED ORAL at 21:17

## 2023-08-02 RX ADMIN — CYCLOBENZAPRINE HYDROCHLORIDE 5 MG: 5 TABLET, FILM COATED ORAL at 21:19

## 2023-08-02 RX ADMIN — FLUOXETINE 40 MG: 20 CAPSULE ORAL at 08:52

## 2023-08-02 RX ADMIN — FLUTICASONE PROPIONATE 2 SPRAY: 50 SPRAY, METERED NASAL at 21:19

## 2023-08-02 RX ADMIN — CYCLOBENZAPRINE HYDROCHLORIDE 5 MG: 5 TABLET, FILM COATED ORAL at 08:54

## 2023-08-02 RX ADMIN — BUSPIRONE HYDROCHLORIDE 10 MG: 10 TABLET ORAL at 13:29

## 2023-08-02 RX ADMIN — BUSPIRONE HYDROCHLORIDE 10 MG: 10 TABLET ORAL at 21:19

## 2023-08-02 RX ADMIN — TRAZODONE HYDROCHLORIDE 100 MG: 100 TABLET ORAL at 21:18

## 2023-08-02 ASSESSMENT — PAIN DESCRIPTION - FREQUENCY: FREQUENCY: CONTINUOUS

## 2023-08-02 ASSESSMENT — PAIN DESCRIPTION - LOCATION
LOCATION: NECK
LOCATION: NECK

## 2023-08-02 ASSESSMENT — PAIN SCALES - GENERAL
PAINLEVEL_OUTOF10: 4
PAINLEVEL_OUTOF10: 4

## 2023-08-02 ASSESSMENT — PAIN DESCRIPTION - ONSET: ONSET: ON-GOING

## 2023-08-02 ASSESSMENT — PAIN DESCRIPTION - ORIENTATION
ORIENTATION: MID
ORIENTATION: MID

## 2023-08-02 ASSESSMENT — PAIN DESCRIPTION - DESCRIPTORS
DESCRIPTORS: ACHING
DESCRIPTORS: NAGGING;ACHING

## 2023-08-02 ASSESSMENT — PAIN DESCRIPTION - PAIN TYPE: TYPE: CHRONIC PAIN

## 2023-08-02 NOTE — PLAN OF CARE
Problem: Discharge Planning  Goal: Discharge to home or other facility with appropriate resources  7/18/2023 2332 by Mary Meek, RN  Outcome: Progressing  Flowsheets (Taken 7/18/2023 2032)  Discharge to home or other facility with appropriate resources: Arrange for needed discharge resources and transportation as appropriate   Pt engaged in tx team. Pt stated to this writer that she would think about inpatient rehab.
Problem: Discharge Planning  Goal: Discharge to home or other facility with appropriate resources  7/21/2023 2148 by Beryl Abreu RN  Outcome: Progressing  7/21/2023 1051 by Eva Pak RN  Outcome: Progressing     Problem: Pain  Goal: Verbalizes/displays adequate comfort level or baseline comfort level  7/21/2023 1051 by Eav Pak RN  Outcome: Progressing     Problem: Safety - Adult  Goal: Free from fall injury  7/21/2023 2148 by Beryl Abreu RN  Outcome: Progressing  7/21/2023 1051 by Eva Pak RN  Outcome: Progressing     Problem: Self Harm/Suicidality  Goal: Will have no self-injury during hospital stay  Description: INTERVENTIONS:  1. Ensure constant observer at bedside with Q15M safety checks  2. Maintain a safe environment  3. Secure patient belongings  4. Ensure family/visitors adhere to safety recommendations  5. Ensure safety tray has been added to patient's diet order  6. Every shift and PRN: Re-assess suicidal risk via Frequent Screener    7/21/2023 2148 by Beryl Abreu RN  Outcome: Progressing  7/21/2023 1051 by Eva Pak RN  Outcome: Progressing     Problem: Depression  Goal: Will be euthymic at discharge  Description: INTERVENTIONS:  1. Administer medication as ordered  2. Provide emotional support via 1:1 interaction with staff  3. Encourage involvement in milieu/groups/activities  4. Monitor for social isolation  7/21/2023 2148 by Beryl Abreu RN  Outcome: Progressing  7/21/2023 1051 by Eva Pak RN  Outcome: Progressing     Problem: Psychosis  Goal: Will report no hallucinations or delusions  Description: INTERVENTIONS:  1. Administer medication as  ordered  2. Assist with reality testing to support increasing orientation  3.  Assess if patient's hallucinations or delusions are encouraging self harm or harm to others and intervene as appropriate  7/21/2023 1051 by Eva Pak RN  Outcome: Progressing     Problem: Anxiety  Goal: Will report anxiety at manageable
Problem: Discharge Planning  Goal: Discharge to home or other facility with appropriate resources  7/24/2023 0112 by Kay Valadez RN  Outcome: Progressing   Pt progressing and actively participating in groups.
Problem: Discharge Planning  Goal: Discharge to home or other facility with appropriate resources  7/24/2023 0112 by Socrates Osorio RN  Outcome: Progressing  7/23/2023 1156 by Cecilia Kim LPN  Outcome: Progressing     Problem: Pain  Goal: Verbalizes/displays adequate comfort level or baseline comfort level  7/24/2023 0112 by Socrates Osorio RN  Outcome: Progressing  7/23/2023 1156 by Cecilia Kim LPN  Outcome: Progressing     Problem: Safety - Adult  Goal: Free from fall injury  Outcome: Progressing     Problem: Depression  Goal: Will be euthymic at discharge  Description: INTERVENTIONS:  1. Administer medication as ordered  2. Provide emotional support via 1:1 interaction with staff  3. Encourage involvement in milieu/groups/activities  4. Monitor for social isolation  Outcome: Progressing     Problem: Anxiety  Goal: Will report anxiety at manageable levels  Description: INTERVENTIONS:  1. Administer medication as ordered  2. Teach and rehearse alternative coping skills  3. Provide emotional support with 1:1 interaction with staff  Outcome: Progressing     Problem: Sleep Disturbance  Goal: Will exhibit normal sleeping pattern  Description: INTERVENTIONS:  1. Administer medication as ordered  2. Decrease environmental stimuli, including noise, as appropriate  3. Discourage social isolation and naps during the day  Outcome: Progressing     Problem: Spiritual Care  Goal: Pt/Family able to move forward in process of forgiving self, others, and/or higher power  Description: INTERVENTIONS:  1. Assist patient/family to overcome blocks to healing by use of spiritual practices (prayer, meditation, guided imagery, reiki, breath work, etc). 2. De-myth guilt and help patient/family identify possible irrational spiritual/cultural beliefs and values. 3. Explore possibilities of making amends & reconciliation with self, others, and/or a greater power.   4. Guide patient/family in identifying painful feelings of
Problem: Discharge Planning  Goal: Discharge to home or other facility with appropriate resources  7/27/2023 2317 by Jada Avery RN  Outcome: Progressing  7/27/2023 0934 by Ozzie Hein RN  Outcome: [address] (Taken 7/27/2023 1902)  Discharge to home or other facility with appropriate resources:   Identify barriers to discharge with patient and caregiver   Arrange for needed discharge resources and transportation as appropriate     Problem: Pain  Goal: Verbalizes/displays adequate comfort level or baseline comfort level  7/27/2023 2317 by Jada Avery RN  Outcome: Progressing  7/27/2023 0934 by Ozzie Hein RN  Outcome: [address] Progressing     Problem: Safety - Adult  Goal: Free from fall injury  7/27/2023 2317 by Jada Avery RN  Outcome: Progressing  7/27/2023 0934 by Ozzie Hein RN  Outcome: [address] Progressing     Problem: Depression  Goal: Will be euthymic at discharge  Description: INTERVENTIONS:  1. Administer medication as ordered  2. Provide emotional support via 1:1 interaction with staff  3. Encourage involvement in milieu/groups/activities  4. Monitor for social isolation  7/27/2023 2317 by Jada Avery RN  Outcome: Progressing  7/27/2023 0934 by Ozzie Hein RN  Outcome: [address] Progressing     Problem: Anxiety  Goal: Will report anxiety at manageable levels  Description: INTERVENTIONS:  1. Administer medication as ordered  2. Teach and rehearse alternative coping skills  3. Provide emotional support with 1:1 interaction with staff  7/27/2023 2317 by Jada Avery RN  Outcome: Progressing  7/27/2023 0934 by Ozzie Hein RN  Outcome: [address] (Taken 7/27/2023 0914)  Will report anxiety at manageable levels: Teach and rehearse alternative coping skills     Problem: Sleep Disturbance  Goal: Will exhibit normal sleeping pattern  Description: INTERVENTIONS:  1. Administer medication as ordered  2.  Decrease
Problem: Discharge Planning  Goal: Discharge to home or other facility with appropriate resources  8/1/2023 2141 by Margareth Jarvis RN  Outcome: Progressing  8/1/2023 0831 by Anuradha Colon LPN  Outcome: Progressing     Problem: Pain  Goal: Verbalizes/displays adequate comfort level or baseline comfort level  8/1/2023 2141 by Margareth Jarvis RN  Outcome: Progressing  8/1/2023 0831 by Anuradha Colon LPN  Outcome: Progressing     Problem: Safety - Adult  Goal: Free from fall injury  8/1/2023 2141 by Margareth Jarvis RN  Outcome: Progressing  8/1/2023 0831 by Anuradha Colon LPN  Outcome: Progressing     Problem: Depression  Goal: Will be euthymic at discharge  Description: INTERVENTIONS:  1. Administer medication as ordered  2. Provide emotional support via 1:1 interaction with staff  3. Encourage involvement in milieu/groups/activities  4. Monitor for social isolation  8/1/2023 2141 by Margareth Jarvis RN  Outcome: Progressing  8/1/2023 0831 by Anuradha Colon LPN  Outcome: Progressing     Problem: Anxiety  Goal: Will report anxiety at manageable levels  Description: INTERVENTIONS:  1. Administer medication as ordered  2. Teach and rehearse alternative coping skills  3. Provide emotional support with 1:1 interaction with staff  8/1/2023 2141 by Margareth Jarvis RN  Outcome: Progressing  8/1/2023 0831 by Anuradha Colon LPN  Outcome: Progressing     Problem: Sleep Disturbance  Goal: Will exhibit normal sleeping pattern  Description: INTERVENTIONS:  1. Administer medication as ordered  2. Decrease environmental stimuli, including noise, as appropriate  3. Discourage social isolation and naps during the day  8/1/2023 2141 by Margareth Jarvis RN  Outcome: Progressing  8/1/2023 0831 by Anuradha Colon LPN  Outcome: Progressing     Problem: Spiritual Care  Goal: Pt/Family able to move forward in process of forgiving self, others, and/or higher power  Description: INTERVENTIONS:  1.  Assist patient/family to overcome blocks to
Problem: Discharge Planning  Goal: Discharge to home or other facility with appropriate resources  Outcome: Progressing     Problem: Pain  Goal: Verbalizes/displays adequate comfort level or baseline comfort level  Outcome: Progressing     Problem: Safety - Adult  Goal: Free from fall injury  Outcome: Progressing     Problem: Depression  Goal: Will be euthymic at discharge  Description: INTERVENTIONS:  1. Administer medication as ordered  2. Provide emotional support via 1:1 interaction with staff  3. Encourage involvement in milieu/groups/activities  4. Monitor for social isolation  Outcome: Progressing     Problem: Anxiety  Goal: Will report anxiety at manageable levels  Description: INTERVENTIONS:  1. Administer medication as ordered  2. Teach and rehearse alternative coping skills  3. Provide emotional support with 1:1 interaction with staff  Outcome: Progressing     Problem: Sleep Disturbance  Goal: Will exhibit normal sleeping pattern  Description: INTERVENTIONS:  1. Administer medication as ordered  2. Decrease environmental stimuli, including noise, as appropriate  3. Discourage social isolation and naps during the day  Outcome: Progressing     Problem: Spiritual Care  Goal: Pt/Family able to move forward in process of forgiving self, others, and/or higher power  Description: INTERVENTIONS:  1. Assist patient/family to overcome blocks to healing by use of spiritual practices (prayer, meditation, guided imagery, reiki, breath work, etc). 2. De-myth guilt and help patient/family identify possible irrational spiritual/cultural beliefs and values. 3. Explore possibilities of making amends & reconciliation with self, others, and/or a greater power. 4. Guide patient/family in identifying painful feelings of guilt. 5. Help patient/famiy explore and identify spiritual beliefs, cultural understandings or values that may help or hinder letting go of issue.   6. Help patient/family explore feelings of anger,
Problem: Discharge Planning  Goal: Discharge to home or other facility with appropriate resources  Outcome: Progressing  Flowsheets (Taken 7/18/2023 2032)  Discharge to home or other facility with appropriate resources: Arrange for needed discharge resources and transportation as appropriate     Problem: Safety - Adult  Goal: Free from fall injury  Outcome: Progressing
Pt continues to be motivated to go to rehab. Pt looking forward to discharging to [place] on Friday. Writer called [place] to confirm admission Friday at 12:00 pm at [facility] location.
guilt and help patient/family identify possible irrational spiritual/cultural beliefs and values. 3. Explore possibilities of making amends & reconciliation with self, others, and/or a greater power. 4. Guide patient/family in identifying painful feelings of guilt. 5. Help patient/famiy explore and identify spiritual beliefs, cultural understandings or values that may help or hinder letting go of issue. 6. Help patient/family explore feelings of anger, bitterness, resentment. 7. Help patient/family identify and examine the situation in which these feelings are experienced. 8. Help patient/family identify destructive displacement of feelings onto other individuals. 9. Invite use of sacraments/rituals/ceremonies as appropriate (e.g. - confession, anointing, smudging). 10. Refer patient/family to formal counseling and/or to micheal community for further support work.   Outcome: Progressing     Problem: Skin/Tissue Integrity - Adult  Goal: Skin integrity remains intact  Outcome: Progressing  Flowsheets (Taken 7/26/2023 1217 by Denilson Duarte RN)  Skin Integrity Remains Intact: Monitor for areas of redness and/or skin breakdown     Problem: Hematologic - Adult  Goal: Maintains hematologic stability  Outcome: Progressing     Problem: ABCDS Injury Assessment  Goal: Absence of physical injury  Outcome: Progressing     Problem: Nutrition Deficit:  Goal: Optimize nutritional status  Outcome: Progressing  Flowsheets (Taken 7/26/2023 1235 by Joel Douglas RD)  Nutrient intake appropriate for improving, restoring, or maintaining nutritional needs:   Assess nutritional status and recommend course of action   Monitor oral intake, labs, and treatment plans   Recommend appropriate diets, oral nutritional supplements, and vitamin/mineral supplements

## 2023-08-03 VITALS
SYSTOLIC BLOOD PRESSURE: 122 MMHG | RESPIRATION RATE: 15 BRPM | HEIGHT: 68 IN | TEMPERATURE: 97.7 F | DIASTOLIC BLOOD PRESSURE: 57 MMHG | WEIGHT: 160 LBS | HEART RATE: 69 BPM | OXYGEN SATURATION: 98 % | BODY MASS INDEX: 24.25 KG/M2

## 2023-08-03 PROCEDURE — 6370000000 HC RX 637 (ALT 250 FOR IP): Performed by: PSYCHIATRY & NEUROLOGY

## 2023-08-03 PROCEDURE — 6370000000 HC RX 637 (ALT 250 FOR IP): Performed by: HOSPITALIST

## 2023-08-03 PROCEDURE — 1240000000 HC EMOTIONAL WELLNESS R&B

## 2023-08-03 RX ADMIN — FLUOXETINE 40 MG: 20 CAPSULE ORAL at 08:17

## 2023-08-03 RX ADMIN — BUSPIRONE HYDROCHLORIDE 10 MG: 10 TABLET ORAL at 21:00

## 2023-08-03 RX ADMIN — ACETAMINOPHEN 650 MG: 325 TABLET, FILM COATED ORAL at 21:01

## 2023-08-03 RX ADMIN — BUSPIRONE HYDROCHLORIDE 10 MG: 10 TABLET ORAL at 08:17

## 2023-08-03 RX ADMIN — FLUTICASONE PROPIONATE 2 SPRAY: 50 SPRAY, METERED NASAL at 21:02

## 2023-08-03 RX ADMIN — CETIRIZINE HYDROCHLORIDE 10 MG: 10 TABLET, FILM COATED ORAL at 08:17

## 2023-08-03 RX ADMIN — BUSPIRONE HYDROCHLORIDE 10 MG: 10 TABLET ORAL at 13:28

## 2023-08-03 RX ADMIN — CYCLOBENZAPRINE HYDROCHLORIDE 5 MG: 5 TABLET, FILM COATED ORAL at 09:43

## 2023-08-03 RX ADMIN — Medication 1 LOZENGE: at 21:02

## 2023-08-03 RX ADMIN — FLUTICASONE PROPIONATE 2 SPRAY: 50 SPRAY, METERED NASAL at 08:17

## 2023-08-03 RX ADMIN — CYCLOBENZAPRINE HYDROCHLORIDE 5 MG: 5 TABLET, FILM COATED ORAL at 21:00

## 2023-08-03 RX ADMIN — TRAZODONE HYDROCHLORIDE 100 MG: 100 TABLET ORAL at 21:00

## 2023-08-03 ASSESSMENT — PAIN DESCRIPTION - LOCATION
LOCATION: GENERALIZED
LOCATION: NECK

## 2023-08-03 ASSESSMENT — PAIN DESCRIPTION - ORIENTATION: ORIENTATION: MID

## 2023-08-03 ASSESSMENT — PAIN - FUNCTIONAL ASSESSMENT: PAIN_FUNCTIONAL_ASSESSMENT: ACTIVITIES ARE NOT PREVENTED

## 2023-08-03 ASSESSMENT — PAIN SCALES - GENERAL
PAINLEVEL_OUTOF10: 9
PAINLEVEL_OUTOF10: 0
PAINLEVEL_OUTOF10: 0
PAINLEVEL_OUTOF10: 4
PAINLEVEL_OUTOF10: 0

## 2023-08-03 ASSESSMENT — PAIN DESCRIPTION - DESCRIPTORS
DESCRIPTORS: ACHING
DESCRIPTORS: ACHING

## 2023-08-04 RX ORDER — FLUOXETINE HYDROCHLORIDE 40 MG/1
40 CAPSULE ORAL DAILY
Qty: 30 CAPSULE | Refills: 3 | Status: SHIPPED | OUTPATIENT
Start: 2023-08-04

## 2023-08-04 RX ORDER — BUSPIRONE HYDROCHLORIDE 10 MG/1
10 TABLET ORAL 3 TIMES DAILY
Qty: 46 TABLET | Refills: 0 | Status: SHIPPED | OUTPATIENT
Start: 2023-08-04 | End: 2023-08-20

## 2023-08-04 RX ORDER — CETIRIZINE HYDROCHLORIDE 10 MG/1
10 TABLET ORAL DAILY
Qty: 30 TABLET | Refills: 0 | Status: SHIPPED | OUTPATIENT
Start: 2023-08-04

## 2023-08-04 RX ORDER — CYCLOBENZAPRINE HCL 5 MG
5 TABLET ORAL 2 TIMES DAILY PRN
Qty: 15 TABLET | Refills: 0 | Status: SHIPPED | OUTPATIENT
Start: 2023-08-04 | End: 2023-08-14

## 2023-08-04 RX ORDER — FLUTICASONE PROPIONATE 50 MCG
2 SPRAY, SUSPENSION (ML) NASAL 2 TIMES DAILY
Qty: 16 G | Refills: 3 | Status: SHIPPED | OUTPATIENT
Start: 2023-08-04

## 2023-08-04 RX ORDER — TRAZODONE HYDROCHLORIDE 100 MG/1
100 TABLET ORAL NIGHTLY PRN
Qty: 30 TABLET | Refills: 0 | Status: SHIPPED | OUTPATIENT
Start: 2023-08-04
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
## 2023-08-04 NOTE — GROUP NOTE
Group Therapy Note    Date: 7/19/2023    Group Start Time: 0900  Group End Time: 0945  Group Topic: Community Meeting    [facility] [place], LPN        Group Therapy Note    Attendees: 3/3       Patient's Goal:  to not be depressed    Notes:  Participated in group    Status After Intervention:  Unchanged    Participation Level:  Active Listener    Participation Quality: Appropriate      Speech:  normal      Thought Process/Content: Logical      Affective Functioning: Congruent      Mood: depressed      Level of consciousness:  Alert and Oriented x4      Response to Learning: Progressing to goal      Endings: Suicidality and None Reported    Modes of Intervention: Education      Discipline Responsible: Licensed Practical Nurse      Signature:  Benton Herrera LPN
Group Therapy Note    Date: 7/19/2023    Group Start Time: 1000  Group End Time: 1100  Group Topic: Activity    [facility]    Maggi Khoury LPN        Group Therapy Note    Attendees: 2/3       Patient's Goal:  to not feel like crying    Notes:  participated in group    Status After Intervention:  Unchanged    Participation Level:  Active Listener    Participation Quality: Appropriate and Attentive      Speech:  normal      Thought Process/Content: Logical      Affective Functioning: Congruent      Mood: depressed      Level of consciousness:  Alert, Oriented x4, and Attentive      Response to Learning: Able to verbalize current knowledge/experience and Progressing to goal      Endings: Suicidality    Modes of Intervention: Education      Discipline Responsible: Licensed Practical Nurse      Signature:  Anuradha Colon LPN
Group Therapy Note    Date: 7/19/2023    Group Start Time: 1335  Group End Time: 1430  Group Topic:  Group     [address]        Group Therapy Note    Attendees: 3/3    Writer facilitated an inpatient process/psych ed group combo. Writer had patients engage in mindfulness and expressive arts activities as a warm up and encouraged patients to process any feelings they may be having, discuss discharge plans, etc. Writer held the space as patients processed. Writer then facilitated a psych-ed discussion about various strengths and provided a handout. Writer encouraged patients to discuss what strengths they have and what strengths they want to bring out. Writer concluded session with a grounding exercise and encouraging patients to provide input and feedback regarding the group. Patient's Goal:  To attend and participate in groups and activities daily. Notes:  Pt tearful at times but was able to listen attentively and participate. Pt was able to establish some goals. Status After Intervention:  Improved    Participation Level:  Active Listener and Interactive    Participation Quality: Appropriate and Attentive      Speech:  normal      Thought Process/Content: Logical  Linear      Affective Functioning: Flat      Mood: depressed      Level of consciousness:  Alert, Oriented x4, and Attentive      Response to Learning: Able to verbalize current knowledge/experience, Able to verbalize/acknowledge new learning, and Progressing to goal      Endings: None Reported    Modes of Intervention: Education, Exploration, and Activity      Discipline Responsible: /Counselor      Signature:  [employer]
Group Therapy Note    Date: 7/19/2023    Group Start Time: 2000  Group End Time: 2045  Group Topic: Wrap-Up    [facility] [address], CNA        Group Therapy Note    Attendees: 2       Patient's Goal:  none    Notes:  participated in group a little     Status After Intervention:  Unchanged    Participation Level: Active Listener and Minimal    Participation Quality: Appropriate      Speech:  hesitant      Thought Process/Content: Perseverating      Affective Functioning: Flat      Mood: anxious and depressed      Level of consciousness:  Alert and Oriented x4      Response to Learning: Resistant      Endings: None Reported    Modes of Intervention: Activity      Discipline Responsible: Behavorial Health Tech      Signature:   Naheed Candelario CNA
Group Therapy Note    Date: 7/20/2023    Group Start Time: 1000  Group End Time: 0251  Group Topic: Community Meeting    [facility]        Group Therapy Note    Attendees: 2       Patient's Goal:   To attend group    Notes:        Status After Intervention:  Improved    Participation Level:  Active Listener    Participation Quality: Appropriate      Speech:  normal      Thought Process/Content: Logical      Affective Functioning: Congruent      Mood: depressed      Level of consciousness:  Alert      Response to Learning: Able to verbalize current knowledge/experience      Endings: Suicidality    Modes of Intervention: Support      Discipline Responsible: /Counselor      Signature:  Mariano Laura
Group Therapy Note    Date: 7/20/2023    Group Start Time: 1000  Group End Time: 7748  Group Topic: Community Meeting    [facility] [facility]        Group Therapy Note    Attendees: 2       Patient's Goal:  To stay focus    Notes:      Status After Intervention:  Improved    Participation Level:  Active Listener    Participation Quality: Appropriate      Speech:  normal      Thought Process/Content: Logical      Affective Functioning: Congruent      Mood: anxious      Level of consciousness:  Alert      Response to Learning: Able to verbalize current knowledge/experience      Endings: None Reported    Modes of Intervention: Support      Discipline Responsible: [address] [facility]      Signature:  Mohit Curiel
Group Therapy Note    Date: 7/20/2023    Group Start Time: 1350  Group End Time: 9910  Group Topic:  Group     [address]        Group Therapy Note    Attendees: 0/0    Writer attempted group. Pt reports being in pain and wanting to sleep at time of group.          Signature:  Randa Berkowitz
Group Therapy Note    Date: 7/20/2023    Group Start Time: 2000  Group End Time: 2100  Group Topic: Wrap-Up    [facility]    Derrick Cristobal        Group Therapy Note    Attendees: 2       Patient's Goal:  to get help    Notes:  happy    Status After Intervention:  Improved    Participation Level:  Active Listener    Participation Quality: Appropriate and Supportive      Speech:  normal      Thought Process/Content: Logical      Affective Functioning: Congruent      Mood:  happy      Level of consciousness:  Alert      Response to Learning: Able to verbalize current knowledge/experience      Endings: None Reported    Modes of Intervention: Support and Socialization      Discipline Responsible: Behavorial Health Tech      Signature:  Derrick Cristobal
Group Therapy Note    Date: 7/21/2023    Group Start Time: 1000  Group End Time: 4131  Group Topic: Nursing    [facility] [address], RN        Group Therapy Note    Attendees: 3/4       Patient's Goal:  Learn to communicate and/or manage emotions, thoughts, and behaviors. Notes:  3/4 attendees    Status After Intervention:  Unchanged    Participation Level:  Active Listener    Participation Quality: Appropriate      Speech:  normal      Thought Process/Content: Logical      Affective Functioning: Congruent      Mood: depressed      Level of consciousness:  Alert      Response to Learning: Able to verbalize current knowledge/experience      Endings: None Reported    Modes of Intervention: Activity and Limit-setting      Discipline Responsible: Registered Nurse      Signature:  Alondra Cruz RN
Group Therapy Note    Date: 7/21/2023    Group Start Time: 1400  Group End Time: 9382  Group Topic: Group Therapy    [facility] [address], RN        Group Therapy Note    Attendees: 4/4       Patient's Goal:  Learn a relaxation technique called visualization. Notes:  Writer instructed patients to close their eyes. Provided brief instruction to listen carefully to writer's words and try to picture or imagine the sensations, sounds, smells, touch, visions, and places described. Status After Intervention:  Improved    Participation Level:  Active Listener    Participation Quality: Appropriate and Attentive      Speech:  normal      Thought Process/Content: Logical      Affective Functioning: Flat      Mood: depressed      Level of consciousness:  Alert      Response to Learning: Able to retain information      Endings: None Reported    Modes of Intervention: Education, Socialization, Exploration, Activity, and Limit-setting      Discipline Responsible: Registered Nurse      Signature:  Souleymane Hinojosa RN
Group Therapy Note    Date: 7/21/2023    Group Start Time: 2000  Group End Time: 2045  Group Topic: Wrap-Up    [facility] [address], CNA        Group Therapy Note    Attendees: 3       Patient's Goal:  none    Notes:  participated in group     Status After Intervention:  Unchanged    Participation Level: Active Listener and Interactive    Participation Quality: Appropriate and Attentive      Speech:  normal      Thought Process/Content: Logical      Affective Functioning: Congruent      Mood: depressed and dysphoric      Level of consciousness:  Alert and Oriented x4      Response to Learning: Able to verbalize current knowledge/experience, Able to verbalize/acknowledge new learning, Able to retain information, Capable of insight, Able to change behavior, and Progressing to goal      Endings: None Reported    Modes of Intervention: Activity      Discipline Responsible: Behavorial Health Tech      Signature:   Blaise Woo CNA
Group Therapy Note    Date: 7/21/2023    Group Start Time: 8756  Group End Time: 0900  Group Topic: Community Meeting    [facility] [address], RN        Group Therapy Note    Attendees: 4/5       Patient's Goal:  Goal Setting    Notes:  Unit orientation    Status After Intervention:  Unchanged    Participation Level: Minimal    Participation Quality: Attentive      Speech:  normal      Thought Process/Content: Logical      Affective Functioning: Congruent      Mood: depressed      Level of consciousness:  Alert      Response to Learning: Capable of insight      Endings: None Reported    Modes of Intervention: Education, Problem-solving, and Limit-setting      Discipline Responsible: Registered Nurse      Signature:  Betito Suresh RN
Group Therapy Note    Date: 7/22/2023    Group Start Time: 1600  Group End Time: 1645  Group Topic: Nursing    [facility] [address], RN        Group Therapy Note    Attendees: 4/4       Patient's Goal:  Urge Surfing via Guided Meditation    Notes: Instruction provided to patients prior to beginning meditation. Status After Intervention:  Unchanged    Participation Level:  Active Listener    Participation Quality: Appropriate      Speech:  normal      Thought Process/Content: Logical      Affective Functioning: Congruent      Mood: anxious and depressed      Level of consciousness:  Alert      Response to Learning: Able to verbalize current knowledge/experience      Endings: None Reported    Modes of Intervention: Education, Support, Exploration, and Activity      Discipline Responsible: Registered Nurse      Signature:  Emilie Rosado RN
Group Therapy Note    Date: 7/22/2023    Group Start Time: 2000  Group End Time: 2045  Group Topic: Wrap-Up    [facility]    Jacki Church        Group Therapy Note    Attendees: 2       Patient's Goal: none    Notes:  happy    Status After Intervention:  Improved    Participation Level:  Active Listener    Participation Quality: Attentive and Supportive      Speech:  normal      Thought Process/Content: Logical      Affective Functioning: Congruent      Mood:  happy      Level of consciousness:  Alert      Response to Learning: Able to verbalize current knowledge/experience      Endings: None Reported    Modes of Intervention: Support and Socialization      Discipline Responsible: Behavorial Health Tech      Signature:  Jacki Church
Group Therapy Note    Date: 7/23/2023    Group Start Time: 1000  Group End Time: 6797  Group Topic: Community Meeting    [facility]        Group Therapy Note    Attendees: 3       Patient's Goal:  Spend time wisely    Notes:      Status After Intervention:  Improved    Participation Level:  Active Listener    Participation Quality: Appropriate      Speech:  normal      Thought Process/Content: Logical      Affective Functioning: Congruent      Mood: elevated      Level of consciousness:  Alert      Response to Learning: Able to verbalize/acknowledge new learning      Endings: None Reported    Modes of Intervention: Support      Discipline Responsible: [address] [facility]      Signature:  Vinicio Hayes
Group Therapy Note    Date: 7/23/2023    Group Start Time: 1400  Group End Time: 9164  Group Topic: Nursing    [facility] [address], LPN        Group Therapy Note    Attendees: 4       Patient's Goal:  Discuss Steps to Taking Care of Yourself    Notes:   Pt will discuss steps to taking care of themselves post discharge. Status After Intervention:  Improved    Participation Level:  Active Listener    Participation Quality: Appropriate      Speech:  normal      Thought Process/Content: Logical      Affective Functioning: Congruent      Mood: anxious      Level of consciousness:  Oriented x4      Response to Learning: Able to verbalize current knowledge/experience, Able to verbalize/acknowledge new learning, Able to retain information, Capable of insight, and Progressing to goal      Endings: None Reported    Modes of Intervention: Education and Problem-solving      Discipline Responsible: Licensed Practical Nurse      Signature:  Yary Valencia LPN
Group Therapy Note    Date: 7/23/2023    Group Start Time: 2000  Group End Time: 2045  Group Topic: Wrap-Up    [facility] [address], CNA        Group Therapy Note    Attendees: 2       Patient's Goal:  Working on not to be depress and not to sleep all the time    Notes:  Participated in group    Status After Intervention:  Improved    Participation Level: Active Listener and Interactive    Participation Quality: Appropriate and Attentive      Speech:  normal      Thought Process/Content: Logical      Affective Functioning: Congruent      Mood: anxious and depressed      Level of consciousness:  Alert and Oriented x4      Response to Learning: Able to verbalize current knowledge/experience, Able to verbalize/acknowledge new learning, Able to retain information, Capable of insight, Able to change behavior, and Progressing to goal      Endings: None Reported    Modes of Intervention: Activity      Discipline Responsible: Behavorial Health Tech      Signature:   Blanca Rodriguez CNA
Group Therapy Note    Date: 7/24/2023    Group Start Time: 1000  Group End Time: 1548  Group Topic: Activity    [facility] [address], CNA        Group Therapy Note    Attendees: 2       Patient's Goal:  to feel better about her self    Notes:  participated in group    Status After Intervention:  Improved    Participation Level: Active Listener and Interactive    Participation Quality: Appropriate and Attentive      Speech:  normal      Thought Process/Content: Logical      Affective Functioning: Congruent      Mood: anxious      Level of consciousness:  Alert, Oriented x4, and Attentive      Response to Learning: Able to verbalize current knowledge/experience, Able to verbalize/acknowledge new learning, Able to retain information, Capable of insight, Able to change behavior, and Progressing to goal      Endings: None Reported    Modes of Intervention: Activity      Discipline Responsible: Behavorial Health Tech      Signature:   Rock Gomez CNA
Group Therapy Note    Date: 7/24/2023    Group Start Time: 1500  Group End Time: 8807  Group Topic:  Group    [facility] [address] [facility]        Group Therapy Note    Attendees: 2/2    Writer facilitated an inpatient process/pscyh-ed group combo. Writer encouraged patients to express any feelings they may be having and encouraged patients to discuss discharge plans, etc. Writer held the space as patients processed. Writer implemented various mindfulness activities. Writer then facilitated a psych-ed discussion about core beliefs and provided a handout. Writer had patients discuss what negative core beliefs they feel they need to work on. Writer concluded session with a grounding exercise and encouraging patients to provide input and feedback regarding the group. Patient's Goal:  To attend and participate in groups and activities daily. Notes:  Pt actively participated. Pt was able to identify negative core beliefs she would like to work on \"I am weak\" \"I am a loser. \" Pt engaged in mindfulness activities. Status After Intervention:  Improved    Participation Level:  Active Listener and Interactive    Participation Quality: Appropriate, Attentive, and Supportive      Speech:  normal      Thought Process/Content: Logical  Linear      Affective Functioning: Congruent      Mood: euthymic      Level of consciousness:  Alert, Oriented x4, and Attentive      Response to Learning: Able to verbalize current knowledge/experience, Able to verbalize/acknowledge new learning, Able to retain information, Capable of insight, and Progressing to goal      Endings: None Reported    Modes of Intervention: Education, Exploration, and Activity      Discipline Responsible: /Counselor      Signature:  Marcela HawkinsSkift
Group Therapy Note    Date: 7/24/2023    Group Start Time: 2000  Group End Time: 2045  Group Topic: Wrap-Up    [facility]    Mendel Pipes        Group Therapy Note    Attendees: 1       Patient's Goal:  none    Notes:  happy    Status After Intervention:  Improved    Participation Level:  Active Listener    Participation Quality: Attentive and Supportive      Speech:  normal      Thought Process/Content: Logical      Affective Functioning: Congruent      Mood:  happy      Level of consciousness:  Alert      Response to Learning: Able to verbalize current knowledge/experience      Endings: None Reported    Modes of Intervention: Support and Socialization      Discipline Responsible: Behavorial Health Tech      Signature:  Mendel Pipes
Group Therapy Note    Date: 7/25/2023    Group Start Time: 0830  Group End Time: 0915  Group Topic: Community Meeting    [address], LPN        Group Therapy Note    Attendees: 3/3       Patient's Goal:  to go to a Rehab    Notes:  Participated in group    Status After Intervention:  Unchanged    Participation Level:  Active Listener and Interactive    Participation Quality: Appropriate, Attentive, Sharing, and Supportive      Speech:  normal      Thought Process/Content: Logical      Affective Functioning: Congruent      Mood: euthymic      Level of consciousness:  Alert, Oriented x4, and Attentive      Response to Learning: Able to verbalize current knowledge/experience, Able to retain information, Capable of insight, and Progressing to goal      Endings: None Reported    Modes of Intervention: Education and Support      Discipline Responsible: Licensed Practical Nurse      Signature:  TALIA Woo LPN
Group Therapy Note    Date: 7/25/2023    Group Start Time: 0930  Group End Time: 7331  Group Topic: Education Group - Inpatient    [facility]    Maggi Khoury LPN        Group Therapy Note    Attendees: 1/3       Patient's Goal:  To stay clean and get help    Notes:  Participated in group/  Self Focus/reflection    Status After Intervention:  Unchanged    Participation Level:  Active Listener and Interactive    Participation Quality: Appropriate, Attentive, Sharing, and Supportive      Speech:  normal      Thought Process/Content: Logical      Affective Functioning: Congruent      Mood: euthymic      Level of consciousness:  Alert, Oriented x4, and Attentive      Response to Learning: Able to verbalize current knowledge/experience, Able to verbalize/acknowledge new learning, Capable of insight, and Progressing to goal      Endings: None Reported    Modes of Intervention: Education      Discipline Responsible: Licensed Practical Nurse      Signature:  Chas Schafer LPN
Group Therapy Note    Date: 7/25/2023    Group Start Time: 1350  Group End Time: 2944  Group Topic:  Group     [address]        Group Therapy Note    Attendees: 2/4    Writer facilitated an inpatient processing/mindfulness group. Writer encouraged patients to engage in a therapeutic expressive arts activity which involved choosing hopeful words for the future. Writer implemented mindfulness exercises. Writer held the space as patients processed. Writer encouraged patients to express any feelings they may be having and encouraged patients to discuss discharge plans, etc. Writer concluded session with a grounding exercise and encouraging patients to provide input and feedback regarding the group. Patient's Goal:  To attend and participate in groups and activities daily. Notes:  Pt actively participated. Pt engaged in expressive arts and was able to come up with words such as \"Hope\" and discuss discharge planning. Status After Intervention:  Improved    Participation Level:  Active Listener and Interactive    Participation Quality: Appropriate, Attentive, and Supportive      Speech:  normal      Thought Process/Content: Logical  Linear      Affective Functioning: Congruent      Mood: euthymic      Level of consciousness:  Alert, Oriented x4, and Attentive      Response to Learning: Able to verbalize current knowledge/experience, Able to verbalize/acknowledge new learning, Able to retain information, Capable of insight, and Progressing to goal      Endings: None Reported    Modes of Intervention: Exploration and Activity      Discipline Responsible: /Counselor      Signature:  Agnes Garcia [facility]
Group Therapy Note    Date: 7/25/2023    Group Start Time: 1500  Group End Time: 7223  Group Topic: Activity    [facility]    Maggi Khoury LPN        Group Therapy Note    Attendees: 4/4       Patient's Goal:  to get to rehab    Notes:  participated in group    Status After Intervention:  Unchanged    Participation Level:  Active Listener    Participation Quality: Appropriate and Attentive      Speech:  normal      Thought Process/Content: Logical      Affective Functioning: Congruent      Mood: fearful      Level of consciousness:  Alert, Oriented x4, and Attentive      Response to Learning: Able to retain information, Capable of insight, and Progressing to goal      Endings: None Reported    Modes of Intervention: Education and Support      Discipline Responsible: Licensed Practical Nurse      Signature:  TALIA Woo LPN
Group Therapy Note    Date: 7/25/2023    Group Start Time: 2000  Group End Time: 2045  Group Topic: Wrap-Up    [facility]    Jovanni Hamilton        Group Therapy Note    Attendees: 1         Patient's Goal:  to get help    Notes:  crying     Status After Intervention:  Improved    Participation Level:  Active Listener    Participation Quality: Appropriate, Attentive, and Sharing      Speech:  normal      Thought Process/Content: Logical      Affective Functioning: Congruent      Mood: fearful and crying      Level of consciousness:  Alert      Response to Learning: Able to verbalize current knowledge/experience and Able to verbalize/acknowledge new learning      Endings: None Reported    Modes of Intervention: Support and Socialization      Discipline Responsible: Be[employer]orial [employer] Tech      Signature:  Jovanni Hamilton
Group Therapy Note    Date: 7/26/2023    Group Start Time: 0930  Group End Time: 1540  Group Topic: Community Meeting    [facility] [address], RN        Group Therapy Note    Attendees: 1/4       Patient's Goal:  DECLINED TO ATTEND GROUP - Pt stated she isn't feeling well, feels weak. Pt is aware she should attend group to progress toward discharge.       Signature:  Lay Rivero RN
Group Therapy Note    Date: 7/26/2023    Group Start Time: 0930  Group End Time: 7421  Group Topic: Community Meeting    [facility] [address], RN        Group Therapy Note    Attendees: 1/4     Patient refused group stating, \"I'm weak and sick today. \"      Signature:  Edelmira Nicholson RN
Group Therapy Note    Date: 7/26/2023    Group Start Time: 1520  Group End Time: 1600  Group Topic:  Group     [address]        Group Therapy Note    Attendees: 2/4    Writer facilitated a processing/grief group combo. Writer implemented mindfulness techniques and encouraged patients to process any feelings they may be having. Writer encouraged patients to discuss discharge plans. Writer then provided a handout regarding grief and various facts. Writer encouraged patients to ask questions or share. Writer concluded session with a grounding exercise and encouraging patients to provide input and feedback regarding the group. Pt not feeling well. Did not attend.          Signature:  Bg Burnham
Group Therapy Note    Date: 7/27/2023    Group Start Time: 1000  Group End Time: 3021  Group Topic: Community Meeting    [facility] [address] [facility]        Group Therapy Note    Attendees: 3       Patient's Goal:  To walk and attend groups. Notes:      Status After Intervention:  Improved    Participation Level:  Active Listener    Participation Quality: Appropriate      Speech:  normal      Thought Process/Content: Logical      Affective Functioning: Congruent      Mood: anxious      Level of consciousness:  Alert      Response to Learning: Able to verbalize current knowledge/experience      Endings: None Reported    Modes of Intervention: Support      Discipline Responsible: [address] [facility]      Signature:  Rashi Begum
Group Therapy Note    Date: 7/27/2023    Group Start Time: 1100  Group End Time: 6529  Group Topic: Nursing    [facility] [address], LPN        Group Therapy Note    Attendees: 4       Patient's Goal:  TO TAKE MEDS. Notes:  MEDICATIONS    Status After Intervention:  Improved    Participation Level:  Active Listener    Participation Quality: Appropriate and Attentive      Speech:  NORMAL      Thought Process/Content: Logical      Affective Functioning: Congruent      Mood:  CALM      Level of consciousness:  Alert      Response to Learning: Able to verbalize current knowledge/experience      Endings: None Reported    Modes of Intervention: Education      Discipline Responsible: Licensed Practical Nurse      Signature:  Hudson Araujo LPN
Group Therapy Note    Date: 7/27/2023    Group Start Time: 1400  Group End Time: 9893  Group Topic:  Group    [facility] [address] [facility]        Group Therapy Note    Attendees: 2/4    Writer facilitated an inpatient process/psych-ed group combo. Writer had patients engage in [employer] work and expressive arts activities as a warm up. Writer held the space as patients processed and encouraged them to express any feelings they may be having, discuss discharge plans, etc. Writer then provided a psych-ed handout regarding Building New Habits. Therapeutic purpose of the handout was to help patients learn practical ways to build habits to help with substance abuse. Writer concluded session with a grounding exercise and encouraging patients to provide input and feedback regarding the group. Patient's Goal:  To attend and participate in groups and activities daily. Notes:  Pt actively participated. Pt was able to discuss her feelings about going to rehab next week. Status After Intervention:  Improved    Participation Level:  Active Listener and Interactive    Participation Quality: Appropriate, Attentive, and Sharing      Speech:  normal      Thought Process/Content: Logical  Linear      Affective Functioning: Congruent      Mood: euthymic      Level of consciousness:  Alert, Oriented x4, and Attentive      Response to Learning: Able to verbalize current knowledge/experience, Able to verbalize/acknowledge new learning, Able to retain information, Capable of insight, and Progressing to goal      Endings: None Reported    Modes of Intervention: Education, Exploration, and Activity      Discipline Responsible: /Counselor      Signature:  Shaq Gandhi, [id number]
Group Therapy Note    Date: 7/28/2023    Group Start Time: 1000  Group End Time: 0412  Group Topic: Community Meeting    [facility]        Group Therapy Note  4  Attendees:        Patient's Goal:  to attend groups    Notes:      Status After Intervention:  Improved    Participation Level: Minimal    Participation Quality: Appropriate      Speech:  normal      Thought Process/Content: Logical      Affective Functioning: Congruent      Mood: depressed      Level of consciousness:  Alert      Response to Learning: Able to verbalize current knowledge/experience      Endings: None Reported    Modes of Intervention: Support      Discipline Responsible: [address]      Signature:  Arjun Louie
Group Therapy Note    Date: 7/28/2023    Group Start Time: 1000  Group End Time: 8771  Group Topic: Community Meeting    [facility]        Group Therapy Note    Attendees: 4       Patient's Goal:  to  deal with sinus infection    Notes:  n/a    Status After Intervention:  Unchanged    Participation Level: Minimal    Participation Quality: Appropriate      Speech:  normal and hesitant      Thought Process/Content: Logical      Affective Functioning: Congruent      Mood: anxious      Level of consciousness:  Alert      Response to Learning: Able to verbalize current knowledge/experience      Endings: None Reported    Modes of Intervention: Support      Discipline Responsible: [address] [facility]      Signature:  Jm Sanabria
Group Therapy Note    Date: 7/28/2023    Group Start Time: 2000  Group End Time: 2100  Group Topic: Wrap-Up    [facility]    Maggi Khoury LPN        Group Therapy Note    Attendees: 5/5       Patient's Goal:  to feel better    Notes: Participated in group    Status After Intervention:  Unchanged    Participation Level:  Active Listener    Participation Quality: Appropriate      Speech:  normal      Thought Process/Content: Logical      Affective Functioning: Congruent      Mood: depressed      Level of consciousness:  Alert and Oriented x4      Response to Learning: Capable of insight      Endings: None Reported    Modes of Intervention: Education and Support      Discipline Responsible: Licensed Practical Nurse      Signature:  TALIA Woo LPN
Group Therapy Note    Date: 7/29/2023    Group Start Time: 1030  Group End Time: 3594  Group Topic: Community Meeting     [address]        Group Therapy Note    Attendees: 5       Patient's Goal:  To relax and recover. Notes:  Pt participated in group this morning. Status After Intervention:  Improved    Participation Level:  Active Listener    Participation Quality: Appropriate      Speech:  normal      Thought Process/Content: Logical      Affective Functioning: Congruent      Mood: elevated      Level of consciousness:  Attentive      Response to Learning: Able to verbalize current knowledge/experience      Endings: None Reported    Modes of Intervention: Activity      Discipline Responsible: [employer]      Signature:  Ten Alvarado
Group Therapy Note    Date: 7/29/2023    Group Start Time: 1100  Group End Time: 1200  Group Topic: Nursing    [facility]    Maggi Khoury LPN        Group Therapy Note    Attendees: 5/5       Patient's Goal:  to feel better    Notes:  Participated in group    Status After Intervention:  Unchanged    Participation Level:  Active Listener and Interactive    Participation Quality: Appropriate and Attentive      Speech:  normal      Thought Process/Content: Logical      Affective Functioning: Congruent      Mood: euthymic      Level of consciousness:  Alert and Oriented x4      Response to Learning: Able to verbalize current knowledge/experience and Progressing to goal      Endings: None Reported    Modes of Intervention: Education and Support      Discipline Responsible: Licensed Practical Nurse      Signature:  TALIA Woo LPN
Group Therapy Note    Date: 7/29/2023    Group Start Time: 2000  Group End Time: 2045  Group Topic: Wrap-Up    [facility] [address], CNA        Group Therapy Note    Attendees: 5       Patient's Goal:  none    Notes:  Participated in Group    Status After Intervention:  Improved    Participation Level: Active Listener    Participation Quality: Appropriate and Attentive      Speech:  normal      Thought Process/Content: Logical      Affective Functioning: Congruent      Mood: anxious and depressed      Level of consciousness:  Alert and Oriented x4      Response to Learning: Able to verbalize current knowledge/experience, Able to verbalize/acknowledge new learning, Able to retain information, Capable of insight, Able to change behavior, and Progressing to goal      Endings: None Reported    Modes of Intervention: Activity      Discipline Responsible: [employer]      Signature:   Mia Jeffries CNA
Group Therapy Note    Date: 7/30/2023    Group Start Time: 1000  Group End Time: 4951  Group Topic: Community Meeting     [address]        Group Therapy Note    Attendees: 6       Patient's Goal:  stay up and attend group and walk. Notes:  Pt participated in group this morning. Status After Intervention:  Improved    Participation Level:  Active Listener    Participation Quality: Attentive      Speech:  normal      Thought Process/Content: Logical      Affective Functioning: Congruent      Mood: elevated      Level of consciousness:  Attentive      Response to Learning: Able to verbalize current knowledge/experience      Endings: None Reported    Modes of Intervention: Activity      Discipline Responsible: [employer]      Signature:  Nicole Lambert
Group Therapy Note    Date: 7/30/2023    Group Start Time: 1100  Group End Time: 7011  Group Topic: Nursing    [facility] [address] [place], LPN        Group Therapy Note    Attendees: 6/6       Patient's Goal:  To not be so hard on myself    Notes:  Self Acceptance Group    Status After Intervention:  Improved    Participation Level:  Active Listener and Interactive    Participation Quality: Appropriate, Attentive, and Sharing      Speech:  normal      Thought Process/Content: Logical      Affective Functioning: Congruent      Mood: euthymic      Level of consciousness:  Alert, Oriented x4, and Attentive      Response to Learning: Capable of insight and Progressing to goal      Endings: None Reported    Modes of Intervention: Education      Discipline Responsible: Licensed Practical Nurse      Signature:  Brittany Turner LPN
Group Therapy Note    Date: 7/30/2023    Group Start Time: 2000  Group End Time: 2045  Group Topic: Wrap-Up    [facility] [address], CNA        Group Therapy Note    Attendees: 6       Patient's Goal:  none    Notes:  Participated in Group    Status After Intervention:  Improved    Participation Level: Active Listener    Participation Quality: Appropriate      Speech:  pressured      Thought Process/Content: Logical      Affective Functioning: Flat      Mood: anxious and depressed      Level of consciousness:  Alert and Oriented x4      Response to Learning: Able to verbalize current knowledge/experience, Able to verbalize/acknowledge new learning, Able to retain information, Capable of insight, and Able to change behavior      Endings: None Reported    Modes of Intervention: Activity      Discipline Responsible: [employer]      Signature:   Yvonne Foster CNA
Group Therapy Note    Date: 7/31/2023    Group Start Time: 2000  Group End Time: 2045  Group Topic: Wrap-Up    [facility] [address], CNA        Group Therapy Note    Attendees: 5       Patient's Goal:  stayed up during the day    Notes:  Participated in Group    Status After Intervention:  Improved    Participation Level: Active Listener and Interactive    Participation Quality: Appropriate and Attentive      Speech:  normal      Thought Process/Content: Logical      Affective Functioning: Congruent      Mood: anxious      Level of consciousness:  Alert and Oriented x4      Response to Learning: Able to verbalize current knowledge/experience, Able to verbalize/acknowledge new learning, Able to retain information, Capable of insight, Able to change behavior, and Progressing to goal      Endings: None Reported    Modes of Intervention: Activity      Discipline Responsible: [employer]      Signature:   Frank Paige CNA
Group Therapy Note    Date: 8/1/2023    Group Start Time: 0830  Group End Time: 6705  Group Topic: Nursing    [facility] [address], LPN        Group Therapy Note    Attendees: 6       Patient's Goal:  TO TAKE MEDS. Notes:  CALM    Status After Intervention:  Improved    Participation Level:  Active Listener    Participation Quality: Appropriate and Attentive      Speech:  normal      Thought Process/Content: Logical      Affective Functioning: Congruent      Mood:  CALM      Level of consciousness:  Alert      Response to Learning: Able to verbalize current knowledge/experience      Endings: None Reported    Modes of Intervention: Education      Discipline Responsible: Licensed Practical Nurse      Signature:  Sharmila Hernandez LPN
Group Therapy Note    Date: 8/1/2023    Group Start Time: 1000  Group End Time: 1030  Group Topic: Community Meeting    [facility] [place]        Group Therapy Note    Attendees: 6       Patient's Goal:  Groups , games walk    Notes:       Status After Intervention:  Improved    Participation Level:  Active Listener and Interactive    Participation Quality: Appropriate      Speech:  normal      Thought Process/Content: Logical      Affective Functioning: Congruent      Mood: depressed      Level of consciousness:  Attentive      Response to Learning: Able to verbalize current knowledge/experience, Able to verbalize/acknowledge new learning, Able to retain information, and Capable of insight      Endings: None Reported    Modes of Intervention: Support      Discipline Responsible: [address] [facility]      Signature:  Altaf Sykes
Group Therapy Note    Date: 8/1/2023    Group Start Time: 1400  Group End Time: 1500  Group Topic:  Group     [address]        Group Therapy Note    Attendees: 7/7    Writer facilitated an inpatient process group. Writer had patients engage in mindfulness and expressive arts and held the space as patients processed feelings, discussed discharge plans, etc. Juldonna Alvaradoena had patients engage in a therapeutic expressive arts activity in which patients were asked to create a vision board of long term and short term goals. Writer encouraged patients to process. Writer concluded session with a grounding exercise and encouraging patients to provide input and feedback regarding the group. Patient's Goal:  To attend and participate in groups and activities daily. Notes:  Pt actively participated. Pt created a vision board and was able to discuss her plans for rehab at [facility] and after. Status After Intervention:  Improved    Participation Level:  Active Listener and Interactive    Participation Quality: Appropriate and Attentive      Speech:  normal      Thought Process/Content: Logical  Linear      Affective Functioning: Congruent      Mood: euthymic      Level of consciousness:  Alert, Oriented x4, and Attentive      Response to Learning: Able to verbalize current knowledge/experience, Able to verbalize/acknowledge new learning, and Capable of insight      Endings: None Reported    Modes of Intervention: Exploration and Activity      Discipline Responsible: /Counselor      Signature:  Maribel Davis
Group Therapy Note    Date: 8/1/2023    Group Start Time: 2000  Group End Time: 2045  Group Topic: Wrap-Up    [facility] [address], CNA        Group Therapy Note    Attendees: 6       Patient's Goal:  none    Notes:  Participated in Group    Status After Intervention:  Improved    Participation Level: Active Listener    Participation Quality: Appropriate and Attentive      Speech:  normal      Thought Process/Content: Logical      Affective Functioning: Congruent      Mood: anxious and depressed      Level of consciousness:  Alert and Oriented x4      Response to Learning: Able to verbalize current knowledge/experience, Able to verbalize/acknowledge new learning, Able to retain information, Capable of insight, Able to change behavior, and Progressing to goal      Endings: None Reported    Modes of Intervention: Activity      Discipline Responsible: [employer]      Signature:   Chiara Armstrong CNA
Group Therapy Note    Date: 8/2/2023    Group Start Time: 1430  Group End Time: 1520  Group Topic:  Group     [address]        Group Therapy Note    Attendees: 5/7    Writer facilitated an inpatient process/psych-ed group combo. Writer implemented expressive arts and various coping skills exercises. Writer held the space as patients processed and discussed discharge plans. Writer encouraged patients to process any feelings they may be having. Writer then facilitated a psych-ed discussion and provided a handout about CBT/Negative Thinking Errors. Writer encouraged patients to discuss which negative thinking errors they need to work on. Writer concluded session with a grounding exercise and encouraging patients to provide input and feedback regarding the group. Patient's Goal:  To attend and participate in groups and activities daily    Notes:  Pt actively participated. Pt was able to discuss that she needs to work on mind reading and focusing on herself. Status After Intervention:  Improved    Participation Level:  Active Listener and Interactive    Participation Quality: Appropriate, Attentive, Sharing, and Supportive      Speech:  normal      Thought Process/Content: Logical  Linear      Affective Functioning: Congruent      Mood: euthymic      Level of consciousness:  Alert, Oriented x4, and Attentive      Response to Learning: Able to verbalize current knowledge/experience, Able to verbalize/acknowledge new learning, Able to retain information, and Progressing to goal      Endings: None Reported    Modes of Intervention: Education, Exploration, and Activity      Discipline Responsible: /Counselor      Signature:  [employer]
Group Therapy Note    Date: 8/2/2023    Group Start Time: 1600  Group End Time: 5049  Group Topic: Nursing    [facility] Andrzej Sandoval RN        Group Therapy Note: Self-Esteem Bingo    Attendees: 4/7       Patient's Goal:  \"cope with life better\"    Notes:  Pt attended group interacting appropriately with staff and peers, showed insight during discussion of Self-Esteem Boosters, Self-Esteem Busters, etc.    Status After Intervention:  Improved    Participation Level:  Active Listener and Interactive    Participation Quality: Appropriate, Attentive, Sharing, and Supportive      Speech:  normal      Thought Process/Content: Logical      Affective Functioning: Congruent      Mood: euthymic      Level of consciousness:  Alert, Oriented x4, and Attentive      Response to Learning: Able to verbalize current knowledge/experience, Able to verbalize/acknowledge new learning, Able to retain information, Capable of insight, Able to change behavior, and Progressing to goal      Endings: None Reported    Modes of Intervention: Education, Support, Socialization, Exploration, Clarifying, and Activity      Discipline Responsible: Registered Nurse      Signature:  Iesha Strickland RN
Group Therapy Note    Date: 8/2/2023    Group Start Time: 2000  Group End Time: 2045  Group Topic: Wrap-Up    [facility] [employer]    [address]        Group Therapy Note    Attendees: 6         Patient's Goal:  none    Notes:  happy    Status After Intervention:  Improved    Participation Level:  Active Listener    Participation Quality: Appropriate      Speech:  normal      Thought Process/Content: Logical      Affective Functioning: Congruent      Mood:  happy      Level of consciousness:  Alert      Response to Learning: Able to verbalize current knowledge/experience      Endings: None Reported    Modes of Intervention: Socialization      Discipline Responsible: [employer]      Signature:  Linwood Sinclair
Group Therapy Note    Date: 8/3/2023    Group Start Time: 0900  Group End Time: 5779  Group Topic: Community Meeting    [facility] [place]        Group Therapy Note 7       Patient's Goal:      Notes:      Status After Intervention:  Improved    Participation Level:  Active Listener and Interactive    Participation Quality: Appropriate      Speech:  normal      Thought Process/Content: Logical      Affective Functioning: Congruent      Mood: anxious      Level of consciousness:  Alert      Response to Learning: Able to verbalize/acknowledge new learning and Able to retain information      Endings:     Modes of Intervention:       Discipline Responsible:       Signature:  Darling Estrada
Group Therapy Note    Date: 8/3/2023    Group Start Time: 2000  Group End Time: 2045  Group Topic: Wrap-Up    [facility]    Pari Maskgabrielle        Group Therapy Note    Attendees: 6       Patient's Goal:  none    Notes:  happy    Status After Intervention:  Improved    Participation Level:  Active Listener    Participation Quality: Supportive      Speech:  normal      Thought Process/Content: Logical      Affective Functioning: Congruent      Mood:  happy      Level of consciousness:  Alert      Response to Learning: Able to verbalize current knowledge/experience      Endings: None Reported    Modes of Intervention: Socialization      Discipline Responsible: [employer]      Signature:  Pari Zuniga
Group Therapy Note    Date: 8/3/2023    Group Start Time: 2531  Group End Time: 5827  Group Topic: Psychoeducation     [address]        Group Therapy Note    Attendees: 7/7    Writer facilitated a psych-ed group using a handout about the concept of Forgiveness. Therapeutic purpose of the group was for patients to get an understanding of what forgiveness is and what it is not in their therapeutic journey. Writer encouraged patients to share and ask questions. Writer concluded with grounding exercise. Patient's Goal:  To attend and participate in groups and activities daily. Notes:  Pt actively participated. Pt was able to discuss her feelings about forgiveness and how it is a process for her. Status After Intervention:  Improved    Participation Level:  Active Listener and Interactive    Participation Quality: Appropriate, Attentive, Sharing, and Supportive      Speech:  normal      Thought Process/Content: Logical  Linear      Affective Functioning: Congruent      Mood: euthymic      Level of consciousness:  Alert, Oriented x4, and Attentive      Response to Learning: Able to verbalize current knowledge/experience, Able to verbalize/acknowledge new learning, Able to retain information, Capable of insight, and Progressing to goal      Endings: None Reported    Modes of Intervention: Education      Discipline Responsible: /Counselor      Signature:  [employer]
Group Therapy Note    Date: 8/3/2023    Group Start Time: 8431  Group End Time: 8289  Group Topic: Process Group - Inpatient     [address]        Group Therapy Note    Attendees: 7/7    Writer facilitated an inpatient process group. Writer encouraged patients to discuss feelings they may be having, discuss discharge plans, etc. Writer held the space as patients processed. Writer implemented mindfulness and expressive arts exercises. Writer concluded with a grounding exercise and encouraging patients to provide input and feedback regarding the group. Patient's Goal:  To attend and participate in groups and activities daily    Notes:  Pt actively participated. Pt was able to to discuss her feelings about going to rehab tomorrow. Status After Intervention:  Improved    Participation Level:  Active Listener and Interactive    Participation Quality: Appropriate, Attentive, and Sharing      Speech:  normal      Thought Process/Content: Logical  Linear      Affective Functioning: Congruent      Mood: euthymic      Level of consciousness:  Alert, Oriented x4, and Attentive      Response to Learning: Able to verbalize current knowledge/experience, Able to verbalize/acknowledge new learning, Able to retain information, Capable of insight, and Progressing to goal      Endings: None Reported    Modes of Intervention: Exploration and Activity      Discipline Responsible: /Counselor      Signature:  [employer]
LPC

## 2023-08-04 NOTE — BH NOTE
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 observed walking the unit. Pt is looking forward to [facility]. Pt alert and oriented x4. Pt denies SI, HI, AVH. Inpatient level of care is needed in order to coordinate discharge to [facility] tomorrow.     LOS:  16  Expected LOS: D/C tomorrow    Insurance info/prescription coverage:  Medicaid  Date of last family contact:  Pt has not signed release   Family requesting physician contact today:  No  Discharge plan:  [place]  Guns in the home:  No  Outpatient provider(s):  [facility]    Participating treatment team members: Annika Ruiz, Trever Blue, [facility]
Behavioral Health Treatment Team Note     Patient goal(s) for today: Pt reports \"Get into rehab. \"  Treatment team focus/goals: medication management, safe discharge planning, attend groups and activities daily    Progress note: Pt observed lying in bed. Affect flat but states she is feeling better than what she was. Pt motivated for rehab. Pt oriented x4. Pt denies SI, HI, AVH. Inpatient level of care is needed in order to stabilize pt further on medications and to establish an appropriate discharge plan (bed to bed recommended). LOS:  6  Expected LOS: 7-8 days    Insurance info/prescription coverage:  [employer] Medicaid  Date of last family contact:  Pt has not signed release   Family requesting physician contact today:  No  Discharge plan:  Inpatient rehab  Guns in the home:  No  Outpatient provider(s):   To be coordinated prior to discharge     Participating treatment team members: Carmenza Coon, Monique Woodard, [facility]
Behavioral Health Treatment Team Note     Patient goal(s) for today: Pt reports \"Not to want the dope anymore. \"  Treatment team focus/goals: medication management, safe discharge planning, attend groups and activities daily    Progress note: Pt observed seated in bed. Pt states she still doesn't feel well but is feeling better than how she was a few days ago. Pt alert and oriented x4. Pt participated in groups. Pt denies SI, HI, AVH. Inpatient level of care is needed in order to stabilize pt further on medications and to coordinate a bed to bed with [facility] to reduce relapse risk. LOS:  9  Expected LOS: 17 days-D/C scheduled for 08/04    Insurance info/prescription coverage:  [employer] Medicaid  Date of last family contact:  Pt has been speaking to one of her daughters.    Family requesting physician contact today:  No  Discharge plan:  Inpatient rehab  Guns in the home:  No  Outpatient provider(s):  [facility]     Participating treatment team members: Sudhir Ramirez, [address] [place] [address]
Behavioral Health Treatment Team Note     Patient goal(s) for today: Pt reports \"Setting goals for rehab. \"  Treatment team focus/goals: medication management, safe discharge planning, attend groups and activities daily    Progress note: Pt observed seated in the day room. Writer provided update regarding [place]. Pt accepted but currently waiting for a bed to open. Pt pleasant and alert and oriented x4. Pt reports decrease in depressive symptoms. Pt denies SI, HI, AVH. An inpatient level of care is needed in order to stabilize pt further on medications and to coordinate a bed to bed to avoid risk of relapse. LOS:  7  Expected LOS: 10-11 days    Insurance info/prescription coverage:  [employer] Medicaid  Date of last family contact:  Pt has not signed release   Family requesting physician contact today:  No  Discharge plan:  Inpatient rehab  Guns in the home:  No  Outpatient provider(s):   To be coordinated prior to discharge     Participating treatment team members: Soraya Saucedo, [facility]
Behavioral Health Treatment Team Note     Patient goal(s) for today: Pt reports \"Stay focused. \"  Treatment team focus/goals: medication management, safe discharge planning, attend groups and activities daily    Progress note: Pt observed seated in bed. Pt alert and oriented x4. Pt denies SI, HI, AVH. Pt states she is looking forward to discharging to [place] on Friday and that this is her first time doing an inpatient program to address substance use. Inpatient level of care is needed in order to stabilize pt further on medications and to coordinate bed to bed Friday to reduce relapse risk.      LOS:  13  Expected LOS: D/C Friday    Insurance info/prescription coverage:  Medicaid  Date of last family contact:  Pt has been talking to daughter  Family requesting physician contact today:  No  Discharge plan:  [place] for inpatient rehab  Guns in the home:  No  Outpatient provider(s):  [facility]    Participating treatment team members: Radha Sandy, Henrique Delaney, [facility]
Behavioral Health Treatment Team Note     Patient goal(s) for today: Pt reports \"Work on managing anxiety. \"  Treatment team focus/goals: medication management, safe discharge planning, attend groups and activities daily    Progress note: Pt observed seated in day room. Pt does state that she feels anxious today but is looking forward to going to rehab. Pt alert and oriented x4. Pt denies SI, HI, AVH. Inpatient level of care is needed so bed to bed can be coordinated to reduce relapse risk.      LOS:  14  Expected LOS: D/C Friday    Insurance info/prescription coverage: Medicaid  Date of last family contact:  Pt has been talking to daughter   Family requesting physician contact today:  No  Discharge plan:  [place] for inpatient rehab  Guns in the home:  No  Outpatient provider(s):  [facility]    Participating treatment team members: Ammy Remy, [facility]
Behavioral Health Treatment Team Note     Patient goal(s) for today: Pt reports \"Work on not mind reading. \"   Treatment team focus/goals: medication management, safe discharge planning, attend groups and activities daily    Progress note: Pt observed walking in the hallway with peer. Pt alert and oriented x4. Pt denies SI, HI, AVH. Pt looking forward to going to rehab Friday. Inpatient level of care is needed so bed to bed can be coordinated to reduce risk of relapse. Pt high risk of SI if bed to bed not coordinated due to living situation use of substances.      LOS:  15  Expected LOS: D/C Friday    Insurance info/prescription coverage:  Medicaid  Date of last family contact:  Pt has been talking to daughter   Family requesting physician contact today:  No  Discharge plan:  [place] for inpatient rehab  Guns in the home:  No  Outpatient provider(s):  [facility]    Participating treatment team members: Remi Hernandez, [facility]
Behavioral Health Treatment Team Note     Patient goal(s) for today: Pt reports \"feel better. \"  Treatment team focus/goals: medication management, safe discharge planning, attend groups and activities daily    Progress note: Pt observed seated in bed during treatment team. Pt reports not feeling well physically today. Pt alert and oriented x4. . Pt denies SI, HI, AVH. Inpatient level of care is needed in order to stabilize pt further on medications and to coordinate bed to bed to avoid risk to relapse. LOS:  8  Expected LOS: 9-10 days    Insurance info/prescription coverage: [employer] Medicaid   Date of last family contact:  Pt has not signed release. Pt does report talking to one of her daughters weekly. Family requesting physician contact today:  No  Discharge plan:  Inpatient rehab, accepted into [facility], waiting bed placement   Guns in the home:  No  Outpatient provider(s):   To be coordinated prior to discharge     Participating treatment team members: Jitendra Pichardo, [facility] called to confirmation pt's admission for tomorrow at noon.
DISCHARGE SUMMARY    Deisy Cardenas  : 1965  MRN: [id number]    The patient Patience Rein exhibits the ability to control behavior in a less restrictive environment. Patient's level of functioning is improving. No assaultive/destructive behavior has been observed for the past 24 hours. No suicidal/homicidal threat or behavior has been observed for the past 24 hours. There is no evidence of serious medication side effects. Patient has not been in physical or protective restraints for at least the past 24 hours. If weapons involved, how are they secured? N/A    Is patient aware of and in agreement with discharge plan? Yes    Arrangements for medication:  Prescriptions [facility]     Copy of discharge instructions to provider?:  Yes    Arrangements for transportation home:  Pt will be taken to [facility] via Medicaid cab    Keep all follow up appointments as scheduled, continue to take prescribed medications per physician instructions.   Mental health crisis number:  395 or your local mental health crisis line number at [facility] WARM LINE      7-284-805-MHAV (0587)      M-F: 9am to 9pm      Sat & Sun: 5pm - 9pm  National suicide prevention lines:                             4-094-FLGSLUD (5-493-443-486-623-8501)       6-485-255-TALK ([phone])    Crisis Text Line:  Text HOME to 194064
Dr. Filiberto Thrasher saw pt. Via [employer] with writer. No New orders.  Discussed with pt. He will let DENNIS Boateng know about her possible GERD issues as well as checking to see how her sinuses are doing. Pt. Has bed at a rehab to leave August 4, 2023. Pt. In agreement, verbalized understanding.
Dr. Venus Pedroza saw pt. Via Skype with writer. Pt. States she is feeling better from her sinus infection this am, and she used to take Augmentin when it gets like this, to get rid of it. Dr. Venus Pedroza, asked to have the Hospitalist evaluate pt. And pt. To relay information to him. Pt. In agreement. Writer relayed information to hospitalist, Dr. Marleni Valle. Dr. Marleni Valle states he will come evaluate pt.
PSYCHOSOCIAL ASSESSMENT  :Patient identifying info:   Emery Pearson is a 62 y.o., female admitted 7/18/2023  5:42 PM     Presenting problem and precipitating factors: Pt admitted voluntarily due to increase in depression symptoms and increase in suicidal ideations. Pt reports \"I'd rather be dead. \" Pt reports stressors recently with housing and not being in contact with family members. Pt has significant hx of substance use. Pt reports feelings of hopelessness and not sleeping. Mental status assessment: Pt alert and oriented x4. Pt is depressed, anxious, and tearful. Pt continues to endorse passive SI without a plan but reports improvements. Pt denies HI, AVH. Pt is considering going to inpatient rehab for substance use. Strengths/Recreation/Coping Skills: Pt reports that she enjoys being outdoors and enjoys crafts and coloring. Collateral information: Pt has not signed release at this time. Current psychiatric /substance abuse providers and contact info: Pt reports having a  with local . Previous psychiatric/substance abuse providers and response to treatment: Pt has been hospitalized multiple times. Family history of mental illness or substance abuse: Significant history of depression and substance abuse    Substance abuse history:    Social History     Tobacco Use    Smoking status: Never    Smokeless tobacco: Never   Substance Use Topics    Alcohol use: Not Currently       History of biomedical complications associated with substance abuse: None reported. Patient's current acceptance of treatment or motivation for change: Pt was able to set a treatment goal \"How to stop being so depressed. How to manage anxiety. \"    Family constellation: Pt has four children, raised by biological parents. Is significant other involved? No    Describe support system: Pt could not identify supports.     Describe living arrangements and home environment: Pt reports that her
Patient is in dining room watching TV and talking with peers in no acute distress at this time. Will continue to monitor q15 minutes for safety per unit protocol.
Patient sitting quietly in room reading. She has been up and out of her room much of the day today. She has interacted appropriately and has participated in group sessions. Will continue to monitor for safety q15 minutes per unit protocol.
Pt attended and participated in group talent contest.  She has been seen out on the unit participating in groups and interacting with staff and peers appropriately.
Pt is scheduled for admission on Friday 08/04 at 12:00 pm. Cab will be scheduled for early morning at 6:00 am.      Location: [address] [place] [address]     Pharmacy used is [facility]  Phone: [address] [employer], [address] [facility] Phone: [phone] Medicaid cab phone: [phone]    Trip ID: [id number]
Pt. Discharged to Rehab in [address] [facility] via taxi. . Pt. Was escorted to front of hospital per CARLOS Jimenez. Pt. Voiced no thoughts of wanting to harm self or others.
Pt. Has been in bed most of the day. Pt. Was encouraged to get up and sit up today, to interact, and sit upright , encouraged fluids to help her sinus infection. Pt. Has not been up except for meals and group. No safety issues noted. Q 15 minute safety checks continue.
Pt. Requested Tylenol for a headache, Flexeril for muscle/body aches/, and a throat lozenge for her sore throat. Upon writer administering the medications to pt. After pt. Has repositioned, drank cold water, etc. Writer educated pt. On trying to sit up more today, that by laying down, her sinuses will have more pressure, and will continue to drain into her throat and make her feel sicker. Pt. Verbalized understanding.
Pt. Up most of the shift, interacting with peers in a positive manner. Smiling, pleasant, good eye contact. No safety concerns noted. Q 15 minute safety checks continue.
Spoke to [facility] for admission assessment. [phone]
Spoke with [facility].  Pt is scheduled for admission on Friday 08/04 at 12:00 pm. Cab will be scheduled for early morning at 6:00 am.     Location: [address] [place] [address]    Pharmacy used is [facility]: [address] [place], [address] [facility]    Addendum: [place] Phone: [phone]
Spoke with admissions at [facility]. Pt has been approved, waiting on female beds to open at location.
TREATMENT TEAM COMPLETED     Attending meeting was as follows:  Patient, DENNIS Cervantes, PAPITO Harley Unit Manager, Mary Day, Charge LPN and Daniele Cho, Licensed Clinical Therapist.       Meeting was conducted via [employer] consists of the following:     Pt. Cognitive status:  Alert and Oriented x 4. Pt. Attending Groups: Yes    Pt. Participating in groups:  Yes    Pt. Homicidal / Suicidal: Denies    Pt. Behaviors:  Patient is alert and oriented x 4. Very pleasant and cooperative. Patient will be going to rehab in the am via cab. Patient will be going to [place]. Patient has shown much improvement since she was admitted and is ready to make changes in her life. Patient is smiling more and interacts well with peers and staff.      Pt. Compliant with Medications:  Yes          New Medication orders:  No      Discharge Plan:  Rehab to [place] in the am
TREATMENT TEAM COMPLETED     Attending meeting was as follows:  Patient, DENNIS Ga, Gian Ch, RN Unit Manager, Ryan Mao, Charge RN and Yajaira Gr,        Meeting was conducted via [employer] consists of the following:     Pt. Cognitive status:  Alert and Oriented x 4. Pt. Attending Groups: Yes    Pt. Participating in groups:  Yes    Pt. Homicidal / Suicidal: Denies    Pt. Behaviors:  Alert and Oriented x 4. Patient has been up playing corn hole with peers this am. Patient's physical appearance looks so much better than when she first admitted. She smiles and enjoys talking and working with staff and gets along well with her peers. Patient is eating and sleeping well. Patient will be discharged to [facility] on Friday. Patient wants very much to get herself together and to work things out with her family. Praised patient for all the work she has done thus far in program     Pt. Compliant with Medications:  Yes          New Medication orders:  No      Discharge Plan:  Rehab. Patient will be discharged to [facility] on Friday.
TREATMENT TEAM COMPLETED     Attending meeting was as follows:  Patient, Dr. Courtney Fabian, RN Unit Manager, Ash Alvarez, Charge RN and [facility] Harm Therapeutic Counselor. Meeting was conducted via [employer]      Daily Treatment Team consists of the following:     Pt. Cognitive status:  Alert but tearful    Pt. Attending Groups: Yes    Pt. Participating in groups:  Yes    Pt. Homicidal / Suicidal: Denies SI/HI ideations    Pt. Behaviors:  Patient up in dayroom coloring which is progress from yesterday. . Patient is very sad and tearful this am. Feels anxious. Patient informed team that should would agree to go to in patient rehab for drug use. Patient will probably go to [place]. Patient has a way to go before she is ready for Long Term Rehab. Pt.  Compliant with Medications:  Yes          New Medication orders: Yes  Buspar 10mg PO TID      Discharge Plan: [facility] for Substance abuse
TREATMENT TEAM COMPLETED     Attending meeting was as follows:  Patient, Dr. Fabian Banks, Rn Unit manager, Tabby Crabtree LPN, and [facility] Hospital[facility]elaide Ruiz, Licensed Clinical Therapist        Meeting was conducted via In person      Daily Treatment Team consists of the following:     Pt. Cognitive status:  Alert and Oriented x 4. Pt. Attending Groups: Yes    Pt. Participating in groups:  Yes    Pt. Homicidal / Suicidal: Denies    Pt. Behaviors:  Alert and Oriented x 4 patient has been up for breakfast and states she did not sleep to good last night. Asking for muscle relaxant for her shoulder. Orders received for Flexaril 5mg PO BID PRN. Patient had her medicals faxed to [facility]. Waiting for return call from [facility] for patient interview. Pt. Compliant with Medications:  Yes          New Medication orders:  Yes  Flexaril 5 mg po BID PRN       Discharge Plan:  Patient will be discahrged to [facility] once approved for admission.
TREATMENT TEAM COMPLETED     Attending meeting was as follows:  Patient, Dr. Haile Morton, RN Unit Manager, Connie Loyola Rn and Yefri Reeves, Licensed Clinical Therapist.        Meeting was conducted via [employer]      Daily Treatment Team consists of the following:     Pt. Cognitive status:  Alert but tearful. Pt. Attending Groups: No    Pt. Participating in groups:  No    Pt. Homicidal / Suicidal: Denies    Pt. Behaviors:  Very flat affect tearful at times. Patient states she used Meth x 7 weeks ago. Feels depressed . States that she has 4 daughters and none of the daughters or her six grandchildren have anything to do with her. States that she is staying in a condemned house that has no electricity and running water so basically she states she is homeless. States that rather be dead than alive and I do not have nothing to live for. Diagnosed at 23 with Bipolar, depression, PTSD and anxiety. States her plan would be to overdose. Patient states that she has been clean from alcohol for the last 6 months. Used Meth 1 month and 3 weeks ago. UDS positive for THC. States her sleep is poor and she has no energy. States that she has been verbally, physically and sexually and suffers from PTSD. Patient is not working at present. Stated she did meth daily for 2 1/2 years. Patient will be started Prozac 20mg PO QD. Pt. Compliant with Medications:  Yes    New Medication orders:  Yes  Prozac 20 mg po QD    Discharge Plan:  Attempting to get patient to go to [place] for drug Rehab Long Term.
TREATMENT TEAM COMPLETED     Attending meeting was as follows:  Patient, Dr. Milagros Oneal, RN Unit Manager, and [facility],  Charge LPN. Meeting was conducted via In person      Daily Treatment Team consists of the following:     Pt. Cognitive status:  Alert and Oriented x 4. Pt. Attending Groups: Yes    Pt. Participating in groups:  Yes    Pt. Homicidal / Suicidal: Denies SI/HI ideations    Pt. Behaviors:  Alert and Oriented x 4. Patient is complaining of pressure under her eyes from sinus. Patient has medications ordered to treat her sinus pain. Patient is waiting for a bed to open up at [address] [place] [address] fr Substance abuse. No new medication orders at this time. Pt. Compliant with Medications: No           New Medication orders:  Yes      Discharge Plan:  Patient will go bed to bed to [facility] for substance abuse rehab.
TREATMENT TEAM COMPLETED     Attending meeting was as follows:  Patient, Dr. Shala Mccain, RN Unit Manager and Nir Burgess RN. Meeting was conducted via in person      Daily Treatment Team consists of the following:     Pt. Cognitive status:  Alert and Oriented x 4    Pt. Attending Groups: Yes    Pt. Participating in groups:  Yes    Pt. Homicidal / Suicidal: Denies SI/HI ideations    Pt. Behaviors:  Alert and Oriented. Has been a little agitated today but has gotten better as the day has progressed. Patient still wants to go to rehab for substance abuse and [facility] is the facility that she will probably be admitted too. Information given to patient on [facility] to give her an idea of program. Patient states that is nervous about going but know sshe needs to go when she is ready for discharge from here. Pt. Compliant with Medications:  Yes          New Medication orders:  No      Discharge Plan:  Discharge to in patient treatment Center ([facility]).
TREATMENT TEAM COMPLETED     Attending meeting was as follows:  Patient, MICHAEL Fuentes, Writer, and Dr. Gabriel Arce. Meeting was conducted via real-time video conference. Tucker Blair      Daily Treatment Team consists of the following:     Pt. Cognitive status:  Awake    Pt. Attending Groups: No, reports not feeling well today. Pt. Participating in groups:  No    Pt. Homicidal / Suicidal: normal    Pt. Behaviors:  Withdrawn and Cooperative    Pt. Compliant with Medications:  Yes          New Medication orders:  Yes, as needed for nausea and sore throat.       Discharge Plan:  Substance Rehab
TREATMENT TEAM COMPLETED    Attending meeting was as follows:  Patient, DaMICHAEL Grider, Writer, Stephanie Lazo RN Manager, and Jade Coates. Meeting was conducted via telehealth. Patient is said to be working on options for placement after discharge from a month in rehab. Daily Treatment Team consists of the following:     Pt. Cognitive status:  Awake    Pt. Attending Groups: Yes    Pt. Participating in groups:  Yes    Pt. Homicidal / Suicidal: normal    Pt. Behaviors:  Guarded/Suspicious, Withdrawn, and Cooperative    Pt.  Compliant with Medications:  Yes          New Medication orders:  No      Discharge Plan:  Rehab
TREATMENT TEAM COMPLETED    Attending meeting was as follows:  Patient, MICHAEL Noyola, Writer, Carmen Miller, RN Manager, and Dr. Kyrie Cadena. Meeting was conducted via telehealth. Daily Treatment Team consists of the following:     Pt. Cognitive status:  Awake    Pt. Attending Groups: Yes    Pt. Participating in groups:  Yes    Pt. Homicidal / Suicidal: normal    Pt. Behaviors:  Cooperative and Pleasant    Pt. Compliant with Medications:  Yes          New Medication orders:  Yes, increased trazodone to 100 mg at HS.       Discharge Plan:  Rehab
TREATMENT TEAM COMPLETED    Attending meeting was as follows:  Patient, Writer, and Dr. Dontae Rachel. Meeting was conducted via telehealth. Daily Treatment Team consists of the following:     Pt. Cognitive status:  Awake    Pt. Attending Groups: Yes    Pt. Participating in groups:  Yes    Pt. Homicidal / Suicidal: normal - declines SI/HI, but is tearful and states she doesn't know why she is so upset. Pt requests a heat pack for her shoulder. Pt. Behaviors:  Anxious, Withdrawn, and Resistive    Pt. Compliant with Medications:  Yes    New Medication orders:  Yes - increased Prozac to 40 mg po daily. Discharge Plan:  Rehab - Pt plans to attend rehab upon discharge from [facility].
Upon writer obtaining vitals, on pt. In her room, pt. Was sitting on the edge of her be tearful and rocking. Writer asked pt. What was bothering her, pt. Staes, \" just sitting here thinking and feeling anxious\", with poor eye contact, looking to the ground. Writer told pt. If she is feeling anxious, she has a right to ask for something to help her. Pt. States,  \" I don't want to be a burden to you guys\". Writer explained, that is what she is here for is help, and that we are here for the patients needs and take care of them to the best of our ability, that we care for our patients. Pt. States, she has been to so many different facilities and doctors without what she feels has been help, even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